# Patient Record
Sex: FEMALE | Race: WHITE
[De-identification: names, ages, dates, MRNs, and addresses within clinical notes are randomized per-mention and may not be internally consistent; named-entity substitution may affect disease eponyms.]

---

## 2018-05-17 ENCOUNTER — HOSPITAL ENCOUNTER (EMERGENCY)
Dept: HOSPITAL 62 - ER | Age: 23
Discharge: HOME | End: 2018-05-17
Payer: OTHER GOVERNMENT

## 2018-05-17 VITALS — DIASTOLIC BLOOD PRESSURE: 71 MMHG | SYSTOLIC BLOOD PRESSURE: 117 MMHG

## 2018-05-17 DIAGNOSIS — O26.891: Primary | ICD-10-CM

## 2018-05-17 DIAGNOSIS — R10.2: ICD-10-CM

## 2018-05-17 DIAGNOSIS — Z3A.00: ICD-10-CM

## 2018-05-17 DIAGNOSIS — O21.9: ICD-10-CM

## 2018-05-17 DIAGNOSIS — Z88.0: ICD-10-CM

## 2018-05-17 DIAGNOSIS — R53.1: ICD-10-CM

## 2018-05-17 LAB
ADD MANUAL DIFF: NO
ALBUMIN SERPL-MCNC: 4.5 G/DL (ref 3.5–5)
ALP SERPL-CCNC: 59 U/L (ref 38–126)
ALT SERPL-CCNC: 39 U/L (ref 9–52)
ANION GAP SERPL CALC-SCNC: 17 MMOL/L (ref 5–19)
APPEARANCE UR: CLEAR
APTT PPP: YELLOW S
AST SERPL-CCNC: 25 U/L (ref 14–36)
BASOPHILS # BLD AUTO: 0.1 10^3/UL (ref 0–0.2)
BASOPHILS NFR BLD AUTO: 0.5 % (ref 0–2)
BILIRUB DIRECT SERPL-MCNC: 0.3 MG/DL (ref 0–0.4)
BILIRUB SERPL-MCNC: 0.5 MG/DL (ref 0.2–1.3)
BILIRUB UR QL STRIP: NEGATIVE
BUN SERPL-MCNC: 7 MG/DL (ref 7–20)
CALCIUM: 10.2 MG/DL (ref 8.4–10.2)
CHLORIDE SERPL-SCNC: 102 MMOL/L (ref 98–107)
CO2 SERPL-SCNC: 22 MMOL/L (ref 22–30)
EOSINOPHIL # BLD AUTO: 0.1 10^3/UL (ref 0–0.6)
EOSINOPHIL NFR BLD AUTO: 0.8 % (ref 0–6)
ERYTHROCYTE [DISTWIDTH] IN BLOOD BY AUTOMATED COUNT: 13.7 % (ref 11.5–14)
GLUCOSE SERPL-MCNC: 78 MG/DL (ref 75–110)
GLUCOSE UR STRIP-MCNC: NEGATIVE MG/DL
HCT VFR BLD CALC: 40.2 % (ref 36–47)
HGB BLD-MCNC: 13.6 G/DL (ref 12–15.5)
KETONES UR STRIP-MCNC: 20 MG/DL
LYMPHOCYTES # BLD AUTO: 2.8 10^3/UL (ref 0.5–4.7)
LYMPHOCYTES NFR BLD AUTO: 18.9 % (ref 13–45)
MCH RBC QN AUTO: 30.6 PG (ref 27–33.4)
MCHC RBC AUTO-ENTMCNC: 33.9 G/DL (ref 32–36)
MCV RBC AUTO: 90 FL (ref 80–97)
MONOCYTES # BLD AUTO: 1 10^3/UL (ref 0.1–1.4)
MONOCYTES NFR BLD AUTO: 6.8 % (ref 3–13)
NEUTROPHILS # BLD AUTO: 10.8 10^3/UL (ref 1.7–8.2)
NEUTS SEG NFR BLD AUTO: 73 % (ref 42–78)
NITRITE UR QL STRIP: NEGATIVE
PH UR STRIP: 6 [PH] (ref 5–9)
PLATELET # BLD: 299 10^3/UL (ref 150–450)
POTASSIUM SERPL-SCNC: 4 MMOL/L (ref 3.6–5)
PROT SERPL-MCNC: 7.6 G/DL (ref 6.3–8.2)
PROT UR STRIP-MCNC: NEGATIVE MG/DL
RBC # BLD AUTO: 4.45 10^6/UL (ref 3.72–5.28)
SODIUM SERPL-SCNC: 141 MMOL/L (ref 137–145)
SP GR UR STRIP: 1
TOTAL CELLS COUNTED % (AUTO): 100 %
UROBILINOGEN UR-MCNC: NEGATIVE MG/DL (ref ?–2)
WBC # BLD AUTO: 14.8 10^3/UL (ref 4–10.5)

## 2018-05-17 PROCEDURE — 84702 CHORIONIC GONADOTROPIN TEST: CPT

## 2018-05-17 PROCEDURE — S0119 ONDANSETRON 4 MG: HCPCS

## 2018-05-17 PROCEDURE — 36415 COLL VENOUS BLD VENIPUNCTURE: CPT

## 2018-05-17 PROCEDURE — 99284 EMERGENCY DEPT VISIT MOD MDM: CPT

## 2018-05-17 PROCEDURE — 80053 COMPREHEN METABOLIC PANEL: CPT

## 2018-05-17 PROCEDURE — 81001 URINALYSIS AUTO W/SCOPE: CPT

## 2018-05-17 PROCEDURE — 85025 COMPLETE CBC W/AUTO DIFF WBC: CPT

## 2018-05-17 NOTE — ER DOCUMENT REPORT
ED General





- General


Chief Complaint: Nausea/Vomiting


Stated Complaint: VOMITING


Time Seen by Provider: 18 19:34


TRAVEL OUTSIDE OF THE [place] IN LAST 30 DAYS: No





- HPI


Notes: 





Patient is a 22-year-old female  who is approximately 8 weeks pregnant who 

presents to the ED complaining of nausea and vomiting intermittently over the 

last couple weeks with an occasional lower pelvic cramping.  Patient states 

that she did have a visit with her provider a week ago and saw her baby on the 

monitor.  Patient states that she is still able to eat and drink, but does have 

a decreased p.o. intake due to the nausea and vomiting.  She has not been 

taking any medicines for her symptoms.  She has not noticed any vaginal 

discharge, odor, or bleeding.  She has not had any other recent illness.  

Patient states that she does feel weak on occasion as well.  Denies any IV drug 

use alcohol involvement.  Denies any headache, fever, neck pain, URI, sore 

throat, chest pain, palpitations, syncope, cough, shortness of breath, wheeze, 

dyspnea, current abdominal pain, diarrhea, urinary retention, dysuria, hematuria

, back pain, loss of control of bowel or bladder, numbness/tingling, saddle 

anesthesia, muscle paralysis/weakness, or rash.





- Related Data


Allergies/Adverse Reactions: 


 





Penicillins Allergy (Verified 18 19:00)


 











Past Medical History





- Social History


Smoking Status: Unknown if Ever Smoked


Family History: Reviewed & Not Pertinent





Review of Systems





- Review of Systems


-: Yes All other systems reviewed and negative





Physical Exam





- Vital signs


Vitals: 


 











Temp Pulse Resp BP Pulse Ox


 


 98.4 F   74   14   117/71   98 


 


 18 19:20  18 19:20  18 19:20  18 19:20  18 19:20














- Notes


Notes: 





PHYSICAL EXAMINATION:





GENERAL: Well-appearing, well-nourished and in no acute distress.





HEAD: Atraumatic, normocephalic.





EYES: Pupils equal round and reactive to light, extraocular movements intact, 

sclera anicteric, conjunctiva are normal.





ENT:  Nares patent and without discharge.  oropharynx clear without exudates.  

No tonsilar hypertrophy or erythema.  Moist mucous membranes. 





NECK: Normal range of motion, supple without lymphadenopathy





LUNGS: Breath sounds clear to auscultation bilaterally and equal.  No wheezes 

rales or rhonchi.





HEART: Regular rate and rhythm without murmurs, rubs, gallops.





ABDOMEN: Soft, nontender, nondistended abdomen.  No guarding, no rebound.  No 

masses appreciated.  Normal bowel sounds present.  No CVA tenderness 

bilaterally.





Musculoskeletal: FROM to passive/active. Strength 5+/5. 





Extremities:  No cyanosis, clubbing, or edema b/l.  Peripheral pulses 2+.  

Capillary refill less than 3 seconds.





NEUROLOGICAL: Normal speech, normal gait.  Normal sensory, motor exams 





PSYCH: Normal mood, normal affect.





SKIN: Warm, Dry, normal turgor, no rashes or lesions noted.





Course





- Re-evaluation


Re-evalutation: 





18 22:33


Patient is an afebrile, well-hydrated, 22-year-old female who presents to the 

ED with nausea, vomiting, intermittent pelvic pain, suspect secondary to her 

pregnancy at this time.  Vitals are acceptable.  PE is otherwise unremarkable.  

CBC, CMP, urinalysis were unremarkable for any acute pathology.  See hCG.  

Fetal heart tones were not able to be heard at this time which is most likely 

due to her being early in her pregnancy.  She is tolerating p.o. without any 

difficulties.  Zofran was given p.o.  No other labs or imaging warranted at 

this time based on H&P.  Patient has no significant tachycardia, tachypnea, or 

hypoxia.  She is nontoxic-appearing.  Patient's abdomen is soft and nontender 

at this time.  Based on H&P today I have low suspicion for acute appendicitis, 

bowel obstruction, acute cholecystitis, acute cholangitis, perforated 

diverticulitis, incarcerated hernia, pancreatitis, perforated ulcer, peritonitis

, sepsis, or other systemic emergent condition at this time.  Patient is aware 

that her condition can change from initial presentation and she needs to 

monitor symptoms closely and seek medical attention if any acute changes.  Rx 

for zofran.  Strict return precautions.  Conservative measures otherwise for 

symptoms.  Recheck with OBGYN in 2-3 days.  Recheck with your PCM in 2-3 days.  

Return to the ED with any worsening/concerning symptoms otherwise as reviewed 

in discharge.  Patient is in agreement.








- Vital Signs


Vital signs: 


 











Temp Pulse Resp BP Pulse Ox


 


 98.4 F   74   14   117/71   98 


 


 18 19:20  18 19:20  18 19:20  18 19:20  18 19:20














- Laboratory


Result Diagrams: 


 18 20:24





 18 20:24


Laboratory results interpreted by me: 


 











  18





  20:24 20:24 20:33


 


WBC  14.8 H  


 


Absolute Neutrophils  10.8 H  


 


Beta HCG, Quant   573317.00 H 


 


Urine Ketones    20 H


 


Ur Leukocyte Esterase    SMALL H














Discharge





- Discharge


Clinical Impression: 


Pelvic pain affecting pregnancy


Qualifiers:


 Trimester: first trimester Qualified Code(s): O26.891 - Other specified 

pregnancy related conditions, first trimester; R10.2 - Pelvic and perineal pain

; R10.2 - Pelvic and perineal pain





Condition: Stable


Disposition: HOME, SELF-CARE


Instructions:  Pelvic Pain in Pregnancy ([facility])


Additional Instructions: 


Maintain fluid intake


Proper hygenic technique


Keep the skin clean


Tylenol as needed


Return immediately if symptoms worsen


F/u with your PCM/OBGYN in 2-3 days for a recheck





Return to the ED with any development of HA/fever, trouble with vision, eye 

redness, worsening pain, urethral discharge, urinary retention, blood in the 

urine, worsening abdominal pain, n/v, Chest Pain, shortness of breath, joint 

pains, trouble breathing, vaginal discharge/bleeding, or any other worsening/

concerning symptoms as needed otherwise.


Prescriptions: 


Ondansetron [Zofran Odt 4 mg Tablet] 1 - 2 tab PO Q4H PRN #15 tab.rapdis


 PRN Reason: For Nausea/Vomiting


Referrals: 


[facility] [Provider Group] - Follow up as needed

## 2018-05-22 ENCOUNTER — HOSPITAL ENCOUNTER (OUTPATIENT)
Dept: HOSPITAL 62 - 2S | Age: 23
Setting detail: OBSERVATION
LOS: 1 days | Discharge: HOME | End: 2018-05-23
Attending: CLINIC/CENTER | Admitting: CLINIC/CENTER
Payer: OTHER GOVERNMENT

## 2018-05-22 DIAGNOSIS — O21.1: Primary | ICD-10-CM

## 2018-05-22 DIAGNOSIS — R63.4: ICD-10-CM

## 2018-05-22 DIAGNOSIS — Z3A.08: ICD-10-CM

## 2018-05-22 LAB
ADD MANUAL DIFF: NO
ALBUMIN SERPL-MCNC: 4.4 G/DL (ref 3.5–5)
ALP SERPL-CCNC: 56 U/L (ref 38–126)
ALT SERPL-CCNC: 45 U/L (ref 9–52)
ANION GAP SERPL CALC-SCNC: 17 MMOL/L (ref 5–19)
APPEARANCE UR: (no result)
APTT PPP: YELLOW S
AST SERPL-CCNC: 40 U/L (ref 14–36)
BASOPHILS # BLD AUTO: 0.1 10^3/UL (ref 0–0.2)
BASOPHILS NFR BLD AUTO: 0.4 % (ref 0–2)
BILIRUB DIRECT SERPL-MCNC: 0.4 MG/DL (ref 0–0.4)
BILIRUB SERPL-MCNC: 0.6 MG/DL (ref 0.2–1.3)
BILIRUB UR QL STRIP: NEGATIVE
BUN SERPL-MCNC: 8 MG/DL (ref 7–20)
CALCIUM: 10.1 MG/DL (ref 8.4–10.2)
CHLORIDE SERPL-SCNC: 102 MMOL/L (ref 98–107)
CO2 SERPL-SCNC: 24 MMOL/L (ref 22–30)
EOSINOPHIL # BLD AUTO: 0.1 10^3/UL (ref 0–0.6)
EOSINOPHIL NFR BLD AUTO: 0.6 % (ref 0–6)
ERYTHROCYTE [DISTWIDTH] IN BLOOD BY AUTOMATED COUNT: 13.9 % (ref 11.5–14)
GLUCOSE SERPL-MCNC: 105 MG/DL (ref 75–110)
GLUCOSE UR STRIP-MCNC: NEGATIVE MG/DL
HCT VFR BLD CALC: 39 % (ref 36–47)
HGB BLD-MCNC: 13.2 G/DL (ref 12–15.5)
KETONES UR STRIP-MCNC: NEGATIVE MG/DL
LYMPHOCYTES # BLD AUTO: 2.3 10^3/UL (ref 0.5–4.7)
LYMPHOCYTES NFR BLD AUTO: 17.4 % (ref 13–45)
MCH RBC QN AUTO: 30.5 PG (ref 27–33.4)
MCHC RBC AUTO-ENTMCNC: 33.9 G/DL (ref 32–36)
MCV RBC AUTO: 90 FL (ref 80–97)
MONOCYTES # BLD AUTO: 1 10^3/UL (ref 0.1–1.4)
MONOCYTES NFR BLD AUTO: 7.4 % (ref 3–13)
NEUTROPHILS # BLD AUTO: 9.7 10^3/UL (ref 1.7–8.2)
NEUTS SEG NFR BLD AUTO: 74.2 % (ref 42–78)
NITRITE UR QL STRIP: NEGATIVE
PH UR STRIP: 5 [PH] (ref 5–9)
PLATELET # BLD: 268 10^3/UL (ref 150–450)
POTASSIUM SERPL-SCNC: 3.8 MMOL/L (ref 3.6–5)
PROT SERPL-MCNC: 7.5 G/DL (ref 6.3–8.2)
PROT UR STRIP-MCNC: NEGATIVE MG/DL
RBC # BLD AUTO: 4.33 10^6/UL (ref 3.72–5.28)
SODIUM SERPL-SCNC: 142.6 MMOL/L (ref 137–145)
SP GR UR STRIP: 1.02
TOTAL CELLS COUNTED % (AUTO): 100 %
UROBILINOGEN UR-MCNC: 4 MG/DL (ref ?–2)
WBC # BLD AUTO: 13.1 10^3/UL (ref 4–10.5)

## 2018-05-22 PROCEDURE — 81001 URINALYSIS AUTO W/SCOPE: CPT

## 2018-05-22 PROCEDURE — 86850 RBC ANTIBODY SCREEN: CPT

## 2018-05-22 PROCEDURE — 86901 BLOOD TYPING SEROLOGIC RH(D): CPT

## 2018-05-22 PROCEDURE — 80053 COMPREHEN METABOLIC PANEL: CPT

## 2018-05-22 PROCEDURE — G0378 HOSPITAL OBSERVATION PER HR: HCPCS

## 2018-05-22 PROCEDURE — 86900 BLOOD TYPING SEROLOGIC ABO: CPT

## 2018-05-22 PROCEDURE — 85025 COMPLETE CBC W/AUTO DIFF WBC: CPT

## 2018-05-22 PROCEDURE — 36415 COLL VENOUS BLD VENIPUNCTURE: CPT

## 2018-05-22 PROCEDURE — G0379 DIRECT REFER HOSPITAL OBSERV: HCPCS

## 2018-05-22 PROCEDURE — S0119 ONDANSETRON 4 MG: HCPCS

## 2018-05-22 PROCEDURE — 86592 SYPHILIS TEST NON-TREP QUAL: CPT

## 2018-05-22 RX ADMIN — ONDANSETRON PRN MG: 4 TABLET, ORALLY DISINTEGRATING ORAL at 22:51

## 2018-05-23 VITALS — SYSTOLIC BLOOD PRESSURE: 116 MMHG | DIASTOLIC BLOOD PRESSURE: 61 MMHG

## 2018-05-23 LAB
ALBUMIN SERPL-MCNC: 3.4 G/DL (ref 3.5–5)
ALP SERPL-CCNC: 49 U/L (ref 38–126)
ALT SERPL-CCNC: 55 U/L (ref 9–52)
ANION GAP SERPL CALC-SCNC: 11 MMOL/L (ref 5–19)
AST SERPL-CCNC: 35 U/L (ref 14–36)
BILIRUB DIRECT SERPL-MCNC: 0.3 MG/DL (ref 0–0.4)
BILIRUB SERPL-MCNC: 0.6 MG/DL (ref 0.2–1.3)
BUN SERPL-MCNC: 3 MG/DL (ref 7–20)
CALCIUM: 9.1 MG/DL (ref 8.4–10.2)
CHLORIDE SERPL-SCNC: 109 MMOL/L (ref 98–107)
CO2 SERPL-SCNC: 20 MMOL/L (ref 22–30)
GLUCOSE SERPL-MCNC: 81 MG/DL (ref 75–110)
POTASSIUM SERPL-SCNC: 3.5 MMOL/L (ref 3.6–5)
PROT SERPL-MCNC: 6 G/DL (ref 6.3–8.2)
SODIUM SERPL-SCNC: 140.4 MMOL/L (ref 137–145)

## 2018-05-23 RX ADMIN — ONDANSETRON PRN MG: 4 TABLET, ORALLY DISINTEGRATING ORAL at 08:20

## 2018-05-25 NOTE — DISCHARGE SUMMARY E
Discharge Summary



NAME: AYAKA CLEMENTS

MRN:  [id number]        : 1995     AGE: 22Y

ADMITTED: 2018                  DISCHARGED: 2018



FINAL DIAGNOSES:

1.  Intrauterine pregnancy 8 weeks with hyperemesis gravidarum.

2.  Dehydration.



HISTORY OF PRESENT ILLNESS:

This is a 22-year-old,  1, para 0, whose last menstrual period was

, known to be 8 weeks pregnant and was having problems with

hyperemesis, dehydration, and weight loss.  She was evaluated in the

office, found to be dehydrated, and it was elected to bring her in the

hospital for further evaluation and treatment.



PAST MEDICAL HISTORY:

Essentially negative.



ALLERGIES:

She is allergic to PENICILLIN.



MEDICATIONS:

She was currently taking Zofran and B6, because of the hyperemesis.



VITAL SIGNS:

At the time of admission her weight was 169 pounds with *------* that is

about a 7 pound weight loss over previous measurement.  Height was 60

inches, blood pressure 102/70, temperature was 98.5, BMI was 33, pulse was

104, pO2 was 96.



REVIEW OF SYSTEMS:

Revealed HEENT to be within normal limits.  Trachea was midline.  Thyroid

was not enlarged.  Lungs were clear.  Heart regular rhythm without

murmurs.  Abdomen is soft, nondistended, nontender with no organomegaly. 

Pelvic examination was consistent with an 8 week intrauterine pregnancy,

positive fetal heart rate, positive fetal movement on Sono.  Extremities

were within normal limits.  Neuro system revealed orientated x3 with

motor, cranial, and sensory nerves being grossly intact.



DIAGNOSTICS:

At the time of admission her white count was 13.1, hemoglobin 13.2,

platelets was 268.  Sodium was 142, potassium 3.8, chloride is 102.  Liver

enzymes revealed AST at 40, ALT at 45.  She had a GFR greater than 60.





HOSPITAL COURSE:

It was our impression to rehydrate her with IVs and treated for the nausea

and vomiting.  She was started on IV rehydration therapy alternating

normal saline with ringers lactated, 5% dextrose.  We came in the next

day.  She had experienced no additional nausea or vomiting.  No vaginal

bleeding at the time.  On , her temperature was 98.5, blood pressure

was 112/62, pulse was 59, respirations were 16.  She was still a little

bit nauseated but as stated no vomiting.  She was alert.  Motor, cranial,

and sensory nerves intact.  Lungs were clear.  Heart regular rhythm

without murmurs, abdomen was soft.  At that time the assessment was that

she had been adequately rehydrated.  She was sent on a BRAT diet.  She was

to follow up with us in the office on 2018.







DICTATING PHYSICIAN:  LUIS SAMS M.D.





[id number]                  DT: 2018    2216

PHY#: [id number]            DD: 2018    1626

ID:   [id number]           JOB#: [id number]       ACCT: [id number]



cc:LUIS SAMS M.D.

>

## 2018-09-07 ENCOUNTER — HOSPITAL ENCOUNTER (OUTPATIENT)
Dept: HOSPITAL 62 - LC | Age: 23
Discharge: HOME | End: 2018-09-07
Attending: OBSTETRICS & GYNECOLOGY
Payer: OTHER GOVERNMENT

## 2018-09-07 DIAGNOSIS — R10.2: ICD-10-CM

## 2018-09-07 DIAGNOSIS — O26.892: Primary | ICD-10-CM

## 2018-09-07 DIAGNOSIS — E86.0: ICD-10-CM

## 2018-09-07 DIAGNOSIS — Z3A.24: ICD-10-CM

## 2018-09-07 DIAGNOSIS — O99.282: ICD-10-CM

## 2018-09-07 LAB
ADD MANUAL DIFF: NO
APPEARANCE UR: (no result)
APTT PPP: YELLOW S
BARBITURATES UR QL SCN: NEGATIVE
BASOPHILS # BLD AUTO: 0 10^3/UL (ref 0–0.2)
BASOPHILS NFR BLD AUTO: 0.4 % (ref 0–2)
BILIRUB UR QL STRIP: NEGATIVE
EOSINOPHIL # BLD AUTO: 0.2 10^3/UL (ref 0–0.6)
EOSINOPHIL NFR BLD AUTO: 2 % (ref 0–6)
ERYTHROCYTE [DISTWIDTH] IN BLOOD BY AUTOMATED COUNT: 13.7 % (ref 11.5–14)
GLUCOSE UR STRIP-MCNC: NEGATIVE MG/DL
HCT VFR BLD CALC: 29.7 % (ref 36–47)
HGB BLD-MCNC: 10.5 G/DL (ref 12–15.5)
KETONES UR STRIP-MCNC: NEGATIVE MG/DL
LYMPHOCYTES # BLD AUTO: 2.4 10^3/UL (ref 0.5–4.7)
LYMPHOCYTES NFR BLD AUTO: 19.6 % (ref 13–45)
MCH RBC QN AUTO: 33.5 PG (ref 27–33.4)
MCHC RBC AUTO-ENTMCNC: 35.2 G/DL (ref 32–36)
MCV RBC AUTO: 95 FL (ref 80–97)
METHADONE UR QL SCN: NEGATIVE
MONOCYTES # BLD AUTO: 1 10^3/UL (ref 0.1–1.4)
MONOCYTES NFR BLD AUTO: 8 % (ref 3–13)
NEUTROPHILS # BLD AUTO: 8.6 10^3/UL (ref 1.7–8.2)
NEUTS SEG NFR BLD AUTO: 70 % (ref 42–78)
NITRITE UR QL STRIP: NEGATIVE
PCP UR QL SCN: NEGATIVE
PH UR STRIP: 6 [PH] (ref 5–9)
PLATELET # BLD: 287 10^3/UL (ref 150–450)
PROT UR STRIP-MCNC: NEGATIVE MG/DL
RBC # BLD AUTO: 3.13 10^6/UL (ref 3.72–5.28)
SP GR UR STRIP: 1.01
TOTAL CELLS COUNTED % (AUTO): 100 %
URINE AMPHETAMINES SCREEN: NEGATIVE
URINE BENZODIAZEPINES SCREEN: NEGATIVE
URINE COCAINE SCREEN: NEGATIVE
URINE MARIJUANA (THC) SCREEN: NEGATIVE
UROBILINOGEN UR-MCNC: NEGATIVE MG/DL (ref ?–2)
WBC # BLD AUTO: 12.3 10^3/UL (ref 4–10.5)

## 2018-09-07 PROCEDURE — 85025 COMPLETE CBC W/AUTO DIFF WBC: CPT

## 2018-09-07 PROCEDURE — 81001 URINALYSIS AUTO W/SCOPE: CPT

## 2018-09-07 PROCEDURE — 4A1HXCZ MONITORING OF PRODUCTS OF CONCEPTION, CARDIAC RATE, EXTERNAL APPROACH: ICD-10-PCS | Performed by: OBSTETRICS & GYNECOLOGY

## 2018-09-07 PROCEDURE — 76815 OB US LIMITED FETUS(S): CPT

## 2018-09-07 PROCEDURE — 94760 N-INVAS EAR/PLS OXIMETRY 1: CPT

## 2018-09-07 PROCEDURE — 80307 DRUG TEST PRSMV CHEM ANLYZR: CPT

## 2018-09-07 PROCEDURE — 36415 COLL VENOUS BLD VENIPUNCTURE: CPT

## 2018-09-07 NOTE — RADIOLOGY REPORT (SQ)
EXAM DESCRIPTION:  U/S OB LIMITED



COMPLETED DATE/TIME:  9/7/2018 9:34 am



REASON FOR STUDY:  cervical length for complaints of pressure



COMPARISON:  None.



TECHNIQUE:  Limited transabdominal grayscale ultrasound for evaluation of specific requested obstetri
daly parameters.



LIMITATIONS:  None.



FINDINGS:  CERVICAL LENGTH: 4.8 cm.   Closed.

PALLAVI: 14.1 cm.

FHR: 147 beats per minute.

PRESENTATION: Cephalic.

PLACENTA:  Posterior.

OTHER: No other significant findings.



IMPRESSION:  LIMITED OBSTETRICAL ULTRASOUND WITH MEASURED PARAMETERS DELINEATED ABOVE.

Trimester of pregnancy: Second trimester - 13 weeks 1 day to 27 weeks 6 days.



TECHNICAL DOCUMENTATION:  JOB ID:  [id number]

 2011 [employer]- All Rights Reserved



Reading location - IP/workstation name: [email or web address]

## 2018-10-01 ENCOUNTER — HOSPITAL ENCOUNTER (OUTPATIENT)
Dept: HOSPITAL 62 - LC | Age: 23
Discharge: HOME | End: 2018-10-01
Attending: OBSTETRICS & GYNECOLOGY
Payer: OTHER GOVERNMENT

## 2018-10-01 DIAGNOSIS — Z3A.27: ICD-10-CM

## 2018-10-01 DIAGNOSIS — O26.892: Primary | ICD-10-CM

## 2018-10-01 DIAGNOSIS — R10.9: ICD-10-CM

## 2018-10-01 LAB
APPEARANCE UR: (no result)
APTT PPP: (no result) S
BARBITURATES UR QL SCN: NEGATIVE
BILIRUB UR QL STRIP: NEGATIVE
GLUCOSE UR STRIP-MCNC: NEGATIVE MG/DL
KETONES UR STRIP-MCNC: 20 MG/DL
METHADONE UR QL SCN: NEGATIVE
NITRITE UR QL STRIP: NEGATIVE
PCP UR QL SCN: NEGATIVE
PH UR STRIP: 5 [PH] (ref 5–9)
PROT UR STRIP-MCNC: 100 MG/DL
SP GR UR STRIP: 1.02
URINE AMPHETAMINES SCREEN: NEGATIVE
URINE BENZODIAZEPINES SCREEN: NEGATIVE
URINE COCAINE SCREEN: NEGATIVE
URINE MARIJUANA (THC) SCREEN: NEGATIVE
UROBILINOGEN UR-MCNC: NEGATIVE MG/DL (ref ?–2)

## 2018-10-01 PROCEDURE — 4A1HXCZ MONITORING OF PRODUCTS OF CONCEPTION, CARDIAC RATE, EXTERNAL APPROACH: ICD-10-PCS | Performed by: OBSTETRICS & GYNECOLOGY

## 2018-10-01 PROCEDURE — 81001 URINALYSIS AUTO W/SCOPE: CPT

## 2018-10-01 PROCEDURE — 80307 DRUG TEST PRSMV CHEM ANLYZR: CPT

## 2018-10-22 ENCOUNTER — HOSPITAL ENCOUNTER (OUTPATIENT)
Dept: HOSPITAL 62 - LC | Age: 23
Discharge: STILL ADMITTED OR ADMINISTRATIVE | End: 2018-10-22
Attending: OBSTETRICS & GYNECOLOGY
Payer: OTHER GOVERNMENT

## 2018-10-22 VITALS — DIASTOLIC BLOOD PRESSURE: 75 MMHG | SYSTOLIC BLOOD PRESSURE: 129 MMHG

## 2018-10-22 DIAGNOSIS — Z3A.30: ICD-10-CM

## 2018-10-22 DIAGNOSIS — R11.2: ICD-10-CM

## 2018-10-22 DIAGNOSIS — O99.283: Primary | ICD-10-CM

## 2018-10-22 DIAGNOSIS — E86.0: ICD-10-CM

## 2018-10-22 LAB
APPEARANCE UR: (no result)
APTT PPP: YELLOW S
BARBITURATES UR QL SCN: NEGATIVE
BILIRUB UR QL STRIP: NEGATIVE
GLUCOSE UR STRIP-MCNC: NEGATIVE MG/DL
KETONES UR STRIP-MCNC: 80 MG/DL
METHADONE UR QL SCN: NEGATIVE
NITRITE UR QL STRIP: NEGATIVE
PCP UR QL SCN: NEGATIVE
PH UR STRIP: 6 [PH] (ref 5–9)
PROT UR STRIP-MCNC: 30 MG/DL
SP GR UR STRIP: 1.02
URINE AMPHETAMINES SCREEN: NEGATIVE
URINE BENZODIAZEPINES SCREEN: NEGATIVE
URINE COCAINE SCREEN: NEGATIVE
URINE MARIJUANA (THC) SCREEN: NEGATIVE
UROBILINOGEN UR-MCNC: NEGATIVE MG/DL (ref ?–2)

## 2018-10-22 PROCEDURE — 80307 DRUG TEST PRSMV CHEM ANLYZR: CPT

## 2018-10-22 PROCEDURE — 4A1HXCZ MONITORING OF PRODUCTS OF CONCEPTION, CARDIAC RATE, EXTERNAL APPROACH: ICD-10-PCS | Performed by: OBSTETRICS & GYNECOLOGY

## 2018-10-22 PROCEDURE — 81001 URINALYSIS AUTO W/SCOPE: CPT

## 2018-10-22 NOTE — ER DOCUMENT REPORT
ED Medical Screen (RME)





- General


Chief Complaint: Nausea/Vomiting


Stated Complaint: VOMITING


Notes: 





Patient is a 23-year-old female that is approximately 30 weeks pregnant, 

complains of nausea and vomiting, seen by triage nursing, and will be 

disposition to L&D, for evaluation.  Vital signs reviewed.  Hemodynamically 

stable, no tachycardia, blood pressure 129/75 I did not personally see or 

evaluate this patient.


TRAVEL OUTSIDE OF THE [place] IN LAST 30 DAYS: No





- Related Data


Allergies/Adverse Reactions: 


 





Penicillins Allergy (Verified 10/01/18 21:33)


 


shellfish derived Allergy (Verified 10/01/18 21:33)


 











Past Medical History


Renal/ Medical History: Denies: Hx Peritoneal Dialysis





Physical Exam





- Vital signs


Vitals: 





 











Temp Pulse Resp BP Pulse Ox


 


 98.6 F   79   16   129/75 H  98 


 


 10/22/18 13:05  10/22/18 13:05  10/22/18 13:05  10/22/18 13:05  10/22/18 13:05














Course





- Vital Signs


Vital signs: 





 











Temp Pulse Resp BP Pulse Ox


 


 98.6 F   79   16   129/75 H  98 


 


 10/22/18 13:05  10/22/18 13:05  10/22/18 13:05  10/22/18 13:05  10/22/18 13:05














Doctor's Discharge





- Discharge


Clinical Impression: 


Nausea & vomiting


Qualifiers:


 Vomiting type: unspecified Vomiting Intractability: unspecified Qualified Code(

s): R11.2 - Nausea with vomiting, unspecified





Condition: Stable


Disposition: LABOR CHECK

## 2018-10-29 ENCOUNTER — HOSPITAL ENCOUNTER (OUTPATIENT)
Dept: HOSPITAL 62 - LC | Age: 23
Discharge: HOME | End: 2018-10-29
Attending: OBSTETRICS & GYNECOLOGY
Payer: OTHER GOVERNMENT

## 2018-10-29 DIAGNOSIS — Z3A.31: ICD-10-CM

## 2018-10-29 DIAGNOSIS — K52.9: ICD-10-CM

## 2018-10-29 DIAGNOSIS — O99.613: Primary | ICD-10-CM

## 2018-10-29 LAB
APPEARANCE UR: (no result)
APTT PPP: (no result) S
BARBITURATES UR QL SCN: NEGATIVE
BILIRUB UR QL STRIP: NEGATIVE
GLUCOSE UR STRIP-MCNC: NEGATIVE MG/DL
KETONES UR STRIP-MCNC: 80 MG/DL
METHADONE UR QL SCN: NEGATIVE
NITRITE UR QL STRIP: NEGATIVE
PCP UR QL SCN: NEGATIVE
PH UR STRIP: 5 [PH] (ref 5–9)
PROT UR STRIP-MCNC: 100 MG/DL
SP GR UR STRIP: 1.02
URINE AMPHETAMINES SCREEN: NEGATIVE
URINE BENZODIAZEPINES SCREEN: NEGATIVE
URINE COCAINE SCREEN: NEGATIVE
URINE MARIJUANA (THC) SCREEN: NEGATIVE
UROBILINOGEN UR-MCNC: NEGATIVE MG/DL (ref ?–2)

## 2018-10-29 PROCEDURE — 81001 URINALYSIS AUTO W/SCOPE: CPT

## 2018-10-29 PROCEDURE — 80307 DRUG TEST PRSMV CHEM ANLYZR: CPT

## 2018-10-29 PROCEDURE — 4A1HXCZ MONITORING OF PRODUCTS OF CONCEPTION, CARDIAC RATE, EXTERNAL APPROACH: ICD-10-PCS | Performed by: OBSTETRICS & GYNECOLOGY

## 2018-11-17 ENCOUNTER — HOSPITAL ENCOUNTER (OUTPATIENT)
Dept: HOSPITAL 62 - LC | Age: 23
Discharge: HOME | End: 2018-11-17
Attending: OBSTETRICS & GYNECOLOGY
Payer: OTHER GOVERNMENT

## 2018-11-17 DIAGNOSIS — Z34.93: Primary | ICD-10-CM

## 2018-11-17 DIAGNOSIS — Z3A.34: ICD-10-CM

## 2018-11-17 LAB
APPEARANCE UR: (no result)
APTT PPP: YELLOW S
BARBITURATES UR QL SCN: NEGATIVE
BILIRUB UR QL STRIP: NEGATIVE
GLUCOSE UR STRIP-MCNC: NEGATIVE MG/DL
KETONES UR STRIP-MCNC: NEGATIVE MG/DL
METHADONE UR QL SCN: NEGATIVE
NITRITE UR QL STRIP: NEGATIVE
PCP UR QL SCN: NEGATIVE
PH UR STRIP: 7 [PH] (ref 5–9)
PROT UR STRIP-MCNC: 30 MG/DL
SP GR UR STRIP: 1.02
URINE AMPHETAMINES SCREEN: NEGATIVE
URINE BENZODIAZEPINES SCREEN: NEGATIVE
URINE COCAINE SCREEN: NEGATIVE
URINE MARIJUANA (THC) SCREEN: NEGATIVE
UROBILINOGEN UR-MCNC: NEGATIVE MG/DL (ref ?–2)

## 2018-11-17 PROCEDURE — 81001 URINALYSIS AUTO W/SCOPE: CPT

## 2018-11-17 PROCEDURE — 80307 DRUG TEST PRSMV CHEM ANLYZR: CPT

## 2018-11-17 PROCEDURE — 84112 EVAL AMNIOTIC FLUID PROTEIN: CPT

## 2018-11-17 PROCEDURE — 4A1HXCZ MONITORING OF PRODUCTS OF CONCEPTION, CARDIAC RATE, EXTERNAL APPROACH: ICD-10-PCS | Performed by: OBSTETRICS & GYNECOLOGY

## 2018-11-17 PROCEDURE — 59025 FETAL NON-STRESS TEST: CPT

## 2018-11-17 NOTE — NON STRESS TEST REPORT
=================================================================

Non Stress Test

=================================================================

Datetime Report Generated by CPN: 11/17/2018 22:30

   

   

=================================================================

DEMOGRAPHIC

=================================================================

   

EGA NST:  34.1

   

=================================================================

INDICATION

=================================================================

   

Indication for Study:  Ordered by Provider; Other

Indication for Study (NST) Other:  Labor check

   

=================================================================

VITAL SIGNS

=================================================================

   

Temperature - NST:  96.8

Pulse - NST:  63

RESP - NST:  16

NBPSYS NST:  115

NBPDIA NST:  72

   

=================================================================

URINE RESULTS

=================================================================

   

Urine Protein, NST:  Positive

Urine Ketones - NST:  Negative

Urine Glucose - NST:  Negative

Urine Blood - NST:  Negative

   

=================================================================

MONITORING

=================================================================

   

Monitor Explained:  Monitor Explained; Test Explained; Patient

   Verbalized Understanding

Time on Monitor:  11/17/2018 20:25

Time off Monitor:  11/17/2018 22:21

NST Duration:  116

   

=================================================================

NST INTERVENTIONS

=================================================================

   

NST Interventions:  PO Hydration; Reposition Patient

Physician Notified NST:  Dr. Alexei

BABY A:  [id number]

   

=================================================================

BABY A

=================================================================

   

Fetal Movement :  Present

Contraction Frequency :  None

FHR Baseline :  125

Accelerations :  15X15

Decelerations :  None

Variability :  Moderate 6-25bpm

NST Review:  Meets Criteria for Reactive NST

NST Review and Verified By :  B.Ring RN

NST Results:  Reactive

   

=================================================================

NST REPORT

=================================================================

   

Report Trigger:  Send Report

## 2019-07-15 ENCOUNTER — HOSPITAL ENCOUNTER (EMERGENCY)
Dept: HOSPITAL 62 - ER | Age: 24
Discharge: OTHER INSTITUTION | End: 2019-07-15
Payer: OTHER GOVERNMENT

## 2019-07-15 VITALS — DIASTOLIC BLOOD PRESSURE: 79 MMHG | SYSTOLIC BLOOD PRESSURE: 128 MMHG

## 2019-07-15 DIAGNOSIS — Z91.013: ICD-10-CM

## 2019-07-15 DIAGNOSIS — Z88.0: ICD-10-CM

## 2019-07-15 DIAGNOSIS — M54.9: ICD-10-CM

## 2019-07-15 DIAGNOSIS — R10.13: Primary | ICD-10-CM

## 2019-07-15 LAB
ALBUMIN SERPL-MCNC: 4.2 G/DL (ref 3.5–5)
ALP SERPL-CCNC: 60 U/L (ref 38–126)
ALT SERPL-CCNC: 16 U/L (ref 9–52)
ANION GAP SERPL CALC-SCNC: 13 MMOL/L (ref 5–19)
APPEARANCE UR: (no result)
APTT PPP: YELLOW S
AST SERPL-CCNC: 18 U/L (ref 14–36)
BILIRUB DIRECT SERPL-MCNC: 0.2 MG/DL (ref 0–0.4)
BILIRUB SERPL-MCNC: 0.3 MG/DL (ref 0.2–1.3)
BILIRUB UR QL STRIP: NEGATIVE
BUN SERPL-MCNC: 9 MG/DL (ref 7–20)
CALCIUM: 9.7 MG/DL (ref 8.4–10.2)
CHLORIDE SERPL-SCNC: 105 MMOL/L (ref 98–107)
CO2 SERPL-SCNC: 22 MMOL/L (ref 22–30)
GLUCOSE SERPL-MCNC: 123 MG/DL (ref 75–110)
GLUCOSE UR STRIP-MCNC: NEGATIVE MG/DL
KETONES UR STRIP-MCNC: NEGATIVE MG/DL
LIPASE SERPL-CCNC: 106.1 U/L (ref 23–300)
NITRITE UR QL STRIP: NEGATIVE
PH UR STRIP: 6 [PH] (ref 5–9)
POTASSIUM SERPL-SCNC: 4.1 MMOL/L (ref 3.6–5)
PROT SERPL-MCNC: 7.3 G/DL (ref 6.3–8.2)
PROT UR STRIP-MCNC: NEGATIVE MG/DL
SODIUM SERPL-SCNC: 140 MMOL/L (ref 137–145)
SP GR UR STRIP: 1.02
UROBILINOGEN UR-MCNC: NEGATIVE MG/DL (ref ?–2)

## 2019-07-15 PROCEDURE — 99283 EMERGENCY DEPT VISIT LOW MDM: CPT

## 2019-07-15 PROCEDURE — 87086 URINE CULTURE/COLONY COUNT: CPT

## 2019-07-15 PROCEDURE — 80053 COMPREHEN METABOLIC PANEL: CPT

## 2019-07-15 PROCEDURE — 83690 ASSAY OF LIPASE: CPT

## 2019-07-15 PROCEDURE — 81001 URINALYSIS AUTO W/SCOPE: CPT

## 2019-07-15 PROCEDURE — 81025 URINE PREGNANCY TEST: CPT

## 2019-07-15 PROCEDURE — 86677 HELICOBACTER PYLORI ANTIBODY: CPT

## 2019-07-15 NOTE — ER DOCUMENT REPORT
ED GI/





- General


Chief Complaint: Epigastric Pain


Stated Complaint: STOMACH AND BACK PAIN


Time Seen by Provider: 07/15/19 03:56


Primary Care Provider: 


LUIS SAMS MD [Primary Care Provider] - Follow up as needed


Notes: 





24-year-old female patient emergency part chief complaint of abdominal pain.  

Patient states that she was seen at another ER 12 hours ago and told she 

probably had an ulcer and was given medicine but states that the medicine is not

working.  Cannot tell me what medicine that is though.  States that she wants 

something for the pain.  States that she received labs and an ultrasound


TRAVEL OUTSIDE OF THE [place] IN LAST 30 DAYS: No





- HPI


Patient complains to provider of: Abdominal pain


Quality of pain: Stabbing


Severity at maximum: Moderate


Severity in ED: Moderate


Pain Level: 5


Location: Epigastric


Vaginal bleeding (Compared to normal period): None





- Related Data


Allergies/Adverse Reactions: 


                                        





Penicillins Allergy (Verified 11/17/18 20:14)


   


shellfish derived Allergy (Verified 11/17/18 20:14)


   











Past Medical History





- General


Information source: Patient





- Social History


Smoking Status: Never Smoker


Chew tobacco use (# tins/day): No


Frequency of alcohol use: None


Drug Abuse: None


Lives with: Family


Family History: Reviewed & Not Pertinent


Patient has suicidal ideation: No


Patient has homicidal ideation: No





- Medical History


Medical History: Negative


Renal/ Medical History: Denies: Hx Peritoneal Dialysis





Review of Systems





- Review of Systems


Notes: 





Constitutional: denies: Chills, Diaphoresis, Fever, Malaise, Weakness





EENT: denies: Eye discharge, Blurred vision, Tearing, Double vision, Nose 

congestion, Nose discharge, Throat swelling, Mouth pain





Cardiovascular: denies: Palpitations, Heart racing, Orthopnea, Dyspnea, Chest 

pain





Respiratory: denies: Cough, Hurts to breathe, Wheezing, Shortness of breath





Gastrointestinal: Positive epigastric abdominal pain, nausea and vomiting, no 

diarrhea.





Genitourinary: denies: Burning, Dysuria, Discharge, Frequency, Flank pain, 

Hematuria





Musculoskeletal:  denies: Joint pain, Joint swelling, Muscle pain, Muscle 

stiffness, back pain





Hematologic/Lymphatic:  denies: Anemia, Easy bleeding, Easy bruising, Blood 

clots





Neurological/Psychological: denies: Confusion, Dementia, Depression, Loss of 

consciousness





Skin: No lesions, no masses, no skin breakdown, no abscesses





Physical Exam





- Vital signs


Vitals: 


                                        











Temp Pulse Resp BP Pulse Ox


 


 98.7 F   93   17   142/90 H  100 


 


 07/15/19 00:43  07/15/19 00:43  07/15/19 00:43  07/15/19 00:43  07/15/19 00:43











Interpretation: Normal





- General


General appearance: Appears well, Alert





- HEENT


Head: Normocephalic, Atraumatic


Eyes: Normal


Pupils: PERRL





- Respiratory


Respiratory status: No respiratory distress


Chest status: Nontender


Breath sounds: Normal


Chest palpation: Normal





- Cardiovascular


Rhythm: Regular


Heart sounds: Normal auscultation


Murmur: No





- Abdominal


Inspection: Normal


Distension: No distension


Bowel sounds: Normal


Tenderness: Tender - Mild tenderness to palpation in the epigastric region.  No 

guarding or rebound.


Organomegaly: No organomegaly





- Back


Back: Normal, Nontender





- Extremities


General upper extremity: Normal inspection, Nontender, Normal color, Normal ROM,

Normal temperature


General lower extremity: Normal inspection, Nontender, Normal color, Normal ROM,

Normal temperature, Normal weight bearing.  No: Delfin's sign





- Neurological


Neuro grossly intact: Yes


Cognition: Normal


Orientation: AAOx4


[place] Coma Scale Eye Opening: Spontaneous


[place] Coma Scale Verbal: Oriented


[place] Coma Scale Motor: Obeys Commands


[place]than Coma Scale Total: 15


Speech: Normal


Motor strength normal: LUE, RUE, LLE, RLE


Sensory: Normal





- Psychological


Associated symptoms: Normal affect, Normal mood





- Skin


Skin Temperature: Warm


Skin Moisture: Dry


Skin Color: Normal





Course





- Re-evaluation


Re-evalutation: 





07/15/19 04:45


A linda conversation was had with patient with regards to her expectations of 

the visit today.  She was seen in another ER 12 hours ago with labs and 

ultrasound and a prescription for something which she does not know what it is 

because she did not bring it with her.  States that she just wants something for

the pain.  I did a bedside ultrasound of the right upper quadrant which did not 

reveal any gallbladder wall thickness, no stones, no pericholecystic fluid and 

no obvious common bile duct dilation.  Do not feel greatly compelled at this 

time to do any further imaging unless her labs come back abnormal.  She has 

normal vital signs.


07/15/19 06:58





Laboratory











  07/15/19 07/15/19





  05:13 05:13


 


Sodium   140.0


 


Potassium   4.1


 


Chloride   105


 


Carbon Dioxide   22


 


Anion Gap   13


 


BUN   9


 


Creatinine   0.58


 


Est GFR ( Amer)   > 60


 


Est GFR (Non-Af Amer)   > 60


 


Glucose   123 H


 


Calcium   9.7


 


Total Bilirubin   0.3


 


Direct Bilirubin   0.2


 


Neonat Total Bilirubin   Not Reportable


 


Neonat Direct Bilirubin   Not Reportable


 


Neonat Indirect Bili   Not Reportable


 


AST   18


 


ALT   16


 


Alkaline Phosphatase   60


 


Total Protein   7.3


 


Albumin   4.2


 


Lipase   106.1


 


Urine Color  YELLOW 


 


Urine Appearance  SLIGHTLY-CLOUDY 


 


Urine pH  6.0 


 


Ur Specific Gravity  1.020 


 


Urine Protein  NEGATIVE 


 


Urine Glucose (UA)  NEGATIVE 


 


Urine Ketones  NEGATIVE 


 


Urine Blood  NEGATIVE 


 


Urine Nitrite  NEGATIVE 


 


Urine Bilirubin  NEGATIVE 


 


Urine Urobilinogen  NEGATIVE 


 


Ur Leukocyte Esterase  SMALL H 


 


Urine WBC (Auto)  29 


 


Urine RBC (Auto)  2 


 


Urine Bacteria (Auto)  TRACE 


 


Squamous Epi Cells Auto  7 


 


Urine Mucus (Auto)  RARE 


 


Urine Yeast (Budding)  PRESENT 


 


Urine Ascorbic Acid  NEGATIVE 


 


Urine HCG, Qual  NEGATIVE 








Patient symptoms were resolved after GI cocktail.  She already had a CBC so that

was not repeated here.  Her LFTs were normal and ultrasound at the bedside was 

unremarkable.  We will give her follow-up information for GI, primary care and 

general surgery





- Vital Signs


Vital signs: 


                                        











Temp Pulse Resp BP Pulse Ox


 


 98.7 F   93   17   142/90 H  100 


 


 07/15/19 00:43  07/15/19 00:43  07/15/19 00:43  07/15/19 00:43  07/15/19 00:43














- Laboratory


Result Diagrams: 


                                 07/15/19 05:13


Laboratory results interpreted by me: 


                                        











  07/15/19 07/15/19





  05:13 05:13


 


Glucose   123 H


 


Ur Leukocyte Esterase  SMALL H 














Discharge





- Discharge


Clinical Impression: 


 Epigastric abdominal pain





Condition: Good


Disposition: ADMITTED AS INPATIENT


Instructions:  Abdominal Pain ([facility])


Additional Instructions: 


It is very important that you follow-up with your primary care doctor as you 

will more likely need to have an endoscopy if the symptoms continue.  In the 

event you notice any black tarry stool, bright red blood in your stool, vomiting

blood or for any other concerns she should return.  We did a helical back to 

pylori test on you but the results will likely take 1 or 2 weeks to come back.  

Please have your primary care doctor get records.


Prescriptions: 


Dicyclomine HCl [Bentyl 10 mg Capsule] 1 cap PO TID PRN #30 cap


 PRN Reason: For Breakthrough Pain


Esomeprazole Magnesium [Nexium 24Hr] 20 mg PO QAM 30 Days #30 capsule.


Sucralfate [Carafate 1 gm Tablet] 1 gm PO ACHS #120 tablet


Sucralfate [Carafate] 1 gm PO ACHS 10 Days #400 oral.susp


Referrals: 


KEVIN MICHAEL MD [ACTIVE STAFF] - Follow up as needed


MICHELLE QUILES MD [ACTIVE STAFF] - Follow up as needed


LUIS SAMS MD [Primary Care Provider] - Follow up in 3-5 days

## 2020-05-07 ENCOUNTER — HOSPITAL ENCOUNTER (EMERGENCY)
Dept: HOSPITAL 62 - ER | Age: 25
Discharge: HOME | End: 2020-05-07
Payer: OTHER GOVERNMENT

## 2020-05-07 VITALS — DIASTOLIC BLOOD PRESSURE: 52 MMHG | SYSTOLIC BLOOD PRESSURE: 101 MMHG

## 2020-05-07 DIAGNOSIS — Z91.013: ICD-10-CM

## 2020-05-07 DIAGNOSIS — K21.9: ICD-10-CM

## 2020-05-07 DIAGNOSIS — Z79.899: ICD-10-CM

## 2020-05-07 DIAGNOSIS — Z88.0: ICD-10-CM

## 2020-05-07 DIAGNOSIS — D72.828: ICD-10-CM

## 2020-05-07 DIAGNOSIS — R10.811: ICD-10-CM

## 2020-05-07 DIAGNOSIS — R10.816: ICD-10-CM

## 2020-05-07 DIAGNOSIS — R10.11: Primary | ICD-10-CM

## 2020-05-07 LAB
ADD MANUAL DIFF: NO
ALBUMIN SERPL-MCNC: 4.5 G/DL (ref 3.5–5)
ALP SERPL-CCNC: 66 U/L (ref 38–126)
ANION GAP SERPL CALC-SCNC: 10 MMOL/L (ref 5–19)
APPEARANCE UR: (no result)
APTT PPP: YELLOW S
AST SERPL-CCNC: 52 U/L (ref 14–36)
BASOPHILS # BLD AUTO: 0.1 10^3/UL (ref 0–0.2)
BASOPHILS NFR BLD AUTO: 0.5 % (ref 0–2)
BILIRUB DIRECT SERPL-MCNC: 0.1 MG/DL (ref 0–0.4)
BILIRUB SERPL-MCNC: 0.5 MG/DL (ref 0.2–1.3)
BILIRUB UR QL STRIP: NEGATIVE
BUN SERPL-MCNC: 12 MG/DL (ref 7–20)
CALCIUM: 9.2 MG/DL (ref 8.4–10.2)
CHLORIDE SERPL-SCNC: 105 MMOL/L (ref 98–107)
CO2 SERPL-SCNC: 23 MMOL/L (ref 22–30)
EOSINOPHIL # BLD AUTO: 0.2 10^3/UL (ref 0–0.6)
EOSINOPHIL NFR BLD AUTO: 1.6 % (ref 0–6)
ERYTHROCYTE [DISTWIDTH] IN BLOOD BY AUTOMATED COUNT: 16.1 % (ref 11.5–14)
GLUCOSE SERPL-MCNC: 89 MG/DL (ref 75–110)
GLUCOSE UR STRIP-MCNC: NEGATIVE MG/DL
HCT VFR BLD CALC: 33.6 % (ref 36–47)
HGB BLD-MCNC: 11.2 G/DL (ref 12–15.5)
KETONES UR STRIP-MCNC: NEGATIVE MG/DL
LYMPHOCYTES # BLD AUTO: 3.3 10^3/UL (ref 0.5–4.7)
LYMPHOCYTES NFR BLD AUTO: 23.8 % (ref 13–45)
MCH RBC QN AUTO: 28.4 PG (ref 27–33.4)
MCHC RBC AUTO-ENTMCNC: 33.3 G/DL (ref 32–36)
MCV RBC AUTO: 85 FL (ref 80–97)
MONOCYTES # BLD AUTO: 1 10^3/UL (ref 0.1–1.4)
MONOCYTES NFR BLD AUTO: 7.5 % (ref 3–13)
NEUTROPHILS # BLD AUTO: 9.3 10^3/UL (ref 1.7–8.2)
NEUTS SEG NFR BLD AUTO: 66.6 % (ref 42–78)
NITRITE UR QL STRIP: NEGATIVE
PH UR STRIP: 5 [PH] (ref 5–9)
PLATELET # BLD: 351 10^3/UL (ref 150–450)
POTASSIUM SERPL-SCNC: 3.8 MMOL/L (ref 3.6–5)
PROT SERPL-MCNC: 7.7 G/DL (ref 6.3–8.2)
PROT UR STRIP-MCNC: NEGATIVE MG/DL
RBC # BLD AUTO: 3.95 10^6/UL (ref 3.72–5.28)
SP GR UR STRIP: 1.03
TOTAL CELLS COUNTED % (AUTO): 100 %
UROBILINOGEN UR-MCNC: NEGATIVE MG/DL (ref ?–2)
WBC # BLD AUTO: 13.9 10^3/UL (ref 4–10.5)

## 2020-05-07 PROCEDURE — 81025 URINE PREGNANCY TEST: CPT

## 2020-05-07 PROCEDURE — 85025 COMPLETE CBC W/AUTO DIFF WBC: CPT

## 2020-05-07 PROCEDURE — 36415 COLL VENOUS BLD VENIPUNCTURE: CPT

## 2020-05-07 PROCEDURE — 99284 EMERGENCY DEPT VISIT MOD MDM: CPT

## 2020-05-07 PROCEDURE — 81001 URINALYSIS AUTO W/SCOPE: CPT

## 2020-05-07 PROCEDURE — 96360 HYDRATION IV INFUSION INIT: CPT

## 2020-05-07 PROCEDURE — 83690 ASSAY OF LIPASE: CPT

## 2020-05-07 PROCEDURE — 76705 ECHO EXAM OF ABDOMEN: CPT

## 2020-05-07 PROCEDURE — 80053 COMPREHEN METABOLIC PANEL: CPT

## 2020-05-07 NOTE — ER DOCUMENT REPORT
ED GI/





- General


Chief Complaint: Abdominal Pain


Stated Complaint: UPPER RIGHT ABDOMINAL PAIN


Time Seen by Provider: 05/07/20 05:20


Primary Care Provider: 


NO RODRIGUEZ MD [Primary Care Provider] - Follow up tomorrow


Notes: 


Patient is a 24-year-old female that comes emergency department for chief 

complaint of sharp pain in her right upper abdomen that radiates around to her 

right mid back.  Pain is intermittent since 4 AM.  She reports nausea but denies

vomiting.  She had a normal bowel movement 2 hours ago.  She missed dinner.  She

denies fever/chills, vaginal bleeding or discharge, pregnancy.  She denies any 

surgeries or daily medications except Nexium.  She denies ever having pain like 

this in the past.





TRAVEL OUTSIDE OF THE [place] IN LAST 30 DAYS: No





- Related Data


Allergies/Adverse Reactions: 


                                        





Penicillins Allergy (Verified 11/17/18 20:14)


   


shellfish derived Allergy (Verified 11/17/18 20:14)


   











Past Medical History





- General


Information source: Patient





- Social History


Smoking Status: Never Smoker


Frequency of alcohol use: Occasional


Drug Abuse: None


Lives with: Family


Family History: Reviewed & Not Pertinent


Renal/ Medical History: Denies: Hx Peritoneal Dialysis


GI Medical History: Reports: Hx Gastroesophageal Reflux Disease


Surgical Hx: Negative





- Immunizations


Immunizations up to date: Yes


Hx Diphtheria, Pertussis, Tetanus Vaccination: Yes





Review of Systems





- Review of Systems


Constitutional: No symptoms reported


EENT: No symptoms reported


Cardiovascular: No symptoms reported


Respiratory: No symptoms reported


Gastrointestinal: See HPI


Genitourinary: See HPI


Female Genitourinary: No symptoms reported


Musculoskeletal: No symptoms reported


Skin: No symptoms reported


Hematologic/Lymphatic: No symptoms reported


Neurological/Psychological: No symptoms reported





Physical Exam





- Vital signs


Vitals: 


                                        











Temp Pulse Resp BP Pulse Ox


 


 98.1 F   86   20   131/70 H  100 


 


 05/07/20 05:19  05/07/20 05:19  05/07/20 05:19  05/07/20 05:19  05/07/20 05:19














- Notes


Notes: 


GENERAL: Alert, interacts well. No acute distress.


HEAD: Normocephalic, atraumatic.


EYES: Pupils equal, round, and reactive to light. Extraocular movements intact.


ENT: Oral mucosa moist, tongue midline. Oropharynx unremarkable. Airway patent. 


NECK: Full range of motion. Supple. Trachea midline. No lymphadenopathy.


LUNGS: Clear to auscultation bilaterally, no wheezes, rales, or rhonchi. No 

respiratory distress. Non-tender chest wall. 


HEART: Regular rate and rhythm. No murmur


ABDOMEN: Patient with epigastric and right upper quadrant tenderness with 

wincing. Remaining abdomen is soft and benign.  Bowel sounds present, abdomen is

not rigid or distended.


GENITOURINARY: Deferred


EXTREMITIES: Moves all 4 extremities spontaneously. No edema, normal radial and 

dorsalis pedis pulses bilaterally. No cyanosis.


BACK: no cervical, thoracic, lumbar midline tenderness. No saddle anesthesia, 

normal distal neurovascular exam. Moves all extremities in full range of motion.


NEUROLOGICAL: Alert and oriented x3. Normal speech. Cranial nerves II through 

XII grossly intact. Strength 5/5 in all extremities. 


PSYCH: Normal affect, normal mood.


SKIN: Warm, dry, normal turgor. No rashes or lesions noted.








Course





- Re-evaluation


Re-evalutation: 


CBC shows mild leukocytosis at 13,000, elevation of neutrophils, no bandemia.  

Chemistry unremarkable except for borderline LFTs, lipase unremarkable, alk phos

unremarkable, bilirubin unremarkable.  Urinalysis shows mildly elevated specific

gravity, otherwise unremarkable.  Patient has been given IV fluids.





On reevaluation patient appears comfortable.





Ultrasound shows questionable mild fatty infiltration of the liver, unremarkable

gallbladder without pericholecystic fluid, thickened wall, stones, or common 

bile duct dilatation.  Right kidney is unremarkable.  Patient patient's location

of pain which is very specific along with intermittent sharp pain I suspect most

strongly gallbladder dyskinesis.  I discussed patient's ultrasound with her, 

discussed her work-up, discussed possibilities, treatment, follow-up, and return

precautions in detail.  Patient states appreciation and agreement.  Stable and 

well-appearing at time of discharge.





- Vital Signs


Vital signs: 


                                        











Temp Pulse Resp BP Pulse Ox


 


 98.4 F   86   20   131/70 H  100 


 


 05/07/20 06:10  05/07/20 05:19  05/07/20 05:19  05/07/20 05:19  05/07/20 05:19














- Laboratory


Result Diagrams: 


                                 05/07/20 06:10





                                 05/07/20 06:10


Laboratory results interpreted by me: 


                                        











  05/07/20 05/07/20





  06:10 06:10


 


WBC  13.9 H 


 


Hgb  11.2 L 


 


Hct  33.6 L 


 


RDW  16.1 H 


 


Absolute Neuts (auto)  9.3 H 


 


AST   52 H














Discharge





- Discharge


Clinical Impression: 


 RUQ pain





Condition: Stable


Disposition: HOME, SELF-CARE


Additional Instructions: 


Your work-up does not show any concerning findings at this time.  You have mild 

fatty infiltration of the liver as discussed.


Based on your symptoms, evaluation, work-up I most strongly suspect gallbladder 

dyskinesis is causing your pain.  The follow-up test to evaluate for this is a 

HIDA scan.  Call your primary care for close follow-up and testing.  In the 

meantime I strongly recommend that you avoid any fatty, fried, or greasy foods 

as this will likely severely increase your symptoms.





You have been provided with pain and nausea medication to take if needed.  

However if pain becomes severe, sharp vomiting, you develop a fever of 100.4 

greater, or any other concerning symptoms develop return to the emergency 

department.


Prescriptions: 


Hydrocodone/Acetaminophen [Norco 5-325 mg Tablet] 1 - 2 tab PO ASDIR PRN #12 

tablet


 PRN Reason: 


Ondansetron [Zofran Odt 4 mg Tablet] 1 - 2 tab PO Q4H PRN #15 tab.rapdis


 PRN Reason: For Nausea/Vomiting


Forms:  Return to Work


Referrals: 


NO RODRIGUEZ MD [Primary Care Provider] - Follow up tomorrow

## 2020-05-09 ENCOUNTER — HOSPITAL ENCOUNTER (INPATIENT)
Dept: HOSPITAL 62 - ER | Age: 25
LOS: 2 days | Discharge: HOME | DRG: 419 | End: 2020-05-11
Attending: SURGERY | Admitting: SURGERY
Payer: MEDICAID

## 2020-05-09 DIAGNOSIS — K76.0: ICD-10-CM

## 2020-05-09 DIAGNOSIS — K21.9: ICD-10-CM

## 2020-05-09 DIAGNOSIS — K80.43: Primary | ICD-10-CM

## 2020-05-09 DIAGNOSIS — Z03.818: ICD-10-CM

## 2020-05-09 LAB
ADD MANUAL DIFF: NO
ALBUMIN SERPL-MCNC: 4.4 G/DL (ref 3.5–5)
ALP SERPL-CCNC: 146 U/L (ref 38–126)
ANION GAP SERPL CALC-SCNC: 10 MMOL/L (ref 5–19)
APPEARANCE UR: CLEAR
APTT PPP: (no result) S
AST SERPL-CCNC: 88 U/L (ref 14–36)
BASOPHILS # BLD AUTO: 0 10^3/UL (ref 0–0.2)
BASOPHILS NFR BLD AUTO: 0.4 % (ref 0–2)
BILIRUB DIRECT SERPL-MCNC: 1.7 MG/DL (ref 0–0.4)
BILIRUB SERPL-MCNC: 2.4 MG/DL (ref 0.2–1.3)
BILIRUB UR QL STRIP: NEGATIVE
BUN SERPL-MCNC: 7 MG/DL (ref 7–20)
CALCIUM: 9.3 MG/DL (ref 8.4–10.2)
CHLORIDE SERPL-SCNC: 103 MMOL/L (ref 98–107)
CO2 SERPL-SCNC: 24 MMOL/L (ref 22–30)
EOSINOPHIL # BLD AUTO: 0.2 10^3/UL (ref 0–0.6)
EOSINOPHIL NFR BLD AUTO: 2.1 % (ref 0–6)
ERYTHROCYTE [DISTWIDTH] IN BLOOD BY AUTOMATED COUNT: 16.2 % (ref 11.5–14)
GLUCOSE SERPL-MCNC: 94 MG/DL (ref 75–110)
GLUCOSE UR STRIP-MCNC: NEGATIVE MG/DL
HCT VFR BLD CALC: 35.6 % (ref 36–47)
HGB BLD-MCNC: 12.3 G/DL (ref 12–15.5)
KETONES UR STRIP-MCNC: NEGATIVE MG/DL
LYMPHOCYTES # BLD AUTO: 1.7 10^3/UL (ref 0.5–4.7)
LYMPHOCYTES NFR BLD AUTO: 17.4 % (ref 13–45)
MCH RBC QN AUTO: 28.9 PG (ref 27–33.4)
MCHC RBC AUTO-ENTMCNC: 34.5 G/DL (ref 32–36)
MCV RBC AUTO: 84 FL (ref 80–97)
MONOCYTES # BLD AUTO: 0.9 10^3/UL (ref 0.1–1.4)
MONOCYTES NFR BLD AUTO: 9.1 % (ref 3–13)
NEUTROPHILS # BLD AUTO: 6.8 10^3/UL (ref 1.7–8.2)
NEUTS SEG NFR BLD AUTO: 71 % (ref 42–78)
NITRITE UR QL STRIP: NEGATIVE
PH UR STRIP: 6 [PH] (ref 5–9)
PLATELET # BLD: 359 10^3/UL (ref 150–450)
POTASSIUM SERPL-SCNC: 4 MMOL/L (ref 3.6–5)
PROT SERPL-MCNC: 7.7 G/DL (ref 6.3–8.2)
PROT UR STRIP-MCNC: NEGATIVE MG/DL
RBC # BLD AUTO: 4.26 10^6/UL (ref 3.72–5.28)
SP GR UR STRIP: 1.01
TOTAL CELLS COUNTED % (AUTO): 100 %
UROBILINOGEN UR-MCNC: NEGATIVE MG/DL (ref ?–2)
WBC # BLD AUTO: 9.6 10^3/UL (ref 4–10.5)

## 2020-05-09 PROCEDURE — C1757 CATH, THROMBECTOMY/EMBOLECT: HCPCS

## 2020-05-09 PROCEDURE — 74330 X-RAY BILE/PANC ENDOSCOPY: CPT

## 2020-05-09 PROCEDURE — 0FT44ZZ RESECTION OF GALLBLADDER, PERCUTANEOUS ENDOSCOPIC APPROACH: ICD-10-PCS | Performed by: SURGERY

## 2020-05-09 PROCEDURE — 96361 HYDRATE IV INFUSION ADD-ON: CPT

## 2020-05-09 PROCEDURE — 36415 COLL VENOUS BLD VENIPUNCTURE: CPT

## 2020-05-09 PROCEDURE — 80076 HEPATIC FUNCTION PANEL: CPT

## 2020-05-09 PROCEDURE — 83690 ASSAY OF LIPASE: CPT

## 2020-05-09 PROCEDURE — 94799 UNLISTED PULMONARY SVC/PX: CPT

## 2020-05-09 PROCEDURE — 81001 URINALYSIS AUTO W/SCOPE: CPT

## 2020-05-09 PROCEDURE — 80048 BASIC METABOLIC PNL TOTAL CA: CPT

## 2020-05-09 PROCEDURE — 87635 SARS-COV-2 COVID-19 AMP PRB: CPT

## 2020-05-09 PROCEDURE — 85025 COMPLETE CBC W/AUTO DIFF WBC: CPT

## 2020-05-09 PROCEDURE — C1769 GUIDE WIRE: HCPCS

## 2020-05-09 PROCEDURE — 43264 ERCP REMOVE DUCT CALCULI: CPT

## 2020-05-09 PROCEDURE — 76705 ECHO EXAM OF ABDOMEN: CPT

## 2020-05-09 PROCEDURE — 99140 ANES COMP EMERGENCY COND: CPT

## 2020-05-09 PROCEDURE — 96375 TX/PRO/DX INJ NEW DRUG ADDON: CPT

## 2020-05-09 PROCEDURE — 80053 COMPREHEN METABOLIC PANEL: CPT

## 2020-05-09 PROCEDURE — 74300 X-RAY BILE DUCTS/PANCREAS: CPT

## 2020-05-09 PROCEDURE — 87040 BLOOD CULTURE FOR BACTERIA: CPT

## 2020-05-09 PROCEDURE — 96374 THER/PROPH/DIAG INJ IV PUSH: CPT

## 2020-05-09 PROCEDURE — 84703 CHORIONIC GONADOTROPIN ASSAY: CPT

## 2020-05-09 PROCEDURE — 99285 EMERGENCY DEPT VISIT HI MDM: CPT

## 2020-05-09 PROCEDURE — 88304 TISSUE EXAM BY PATHOLOGIST: CPT

## 2020-05-09 PROCEDURE — BF13YZZ FLUOROSCOPY OF GALLBLADDER AND BILE DUCTS USING OTHER CONTRAST: ICD-10-PCS | Performed by: SURGERY

## 2020-05-09 RX ADMIN — SODIUM CHLORIDE PRN MLS/HR: 9 INJECTION, SOLUTION INTRAVENOUS at 14:47

## 2020-05-09 RX ADMIN — Medication SCH ML: at 06:13

## 2020-05-09 RX ADMIN — Medication SCH ML: at 14:44

## 2020-05-09 RX ADMIN — BUPIVACAINE HYDROCHLORIDE ONE ML: 2.5 INJECTION, SOLUTION EPIDURAL; INFILTRATION; INTRACAUDAL; PERINEURAL at 10:50

## 2020-05-09 RX ADMIN — Medication SCH: at 21:02

## 2020-05-09 RX ADMIN — SODIUM CHLORIDE PRN MLS/HR: 9 INJECTION, SOLUTION INTRAVENOUS at 23:40

## 2020-05-09 RX ADMIN — SODIUM CHLORIDE PRN MLS/HR: 9 INJECTION, SOLUTION INTRAVENOUS at 08:34

## 2020-05-09 RX ADMIN — BUPIVACAINE HYDROCHLORIDE ONE ML: 2.5 INJECTION, SOLUTION EPIDURAL; INFILTRATION; INTRACAUDAL; PERINEURAL at 11:58

## 2020-05-09 RX ADMIN — MORPHINE SULFATE PRN MG: 10 INJECTION INTRAMUSCULAR; INTRAVENOUS; SUBCUTANEOUS at 23:40

## 2020-05-09 NOTE — OPERATIVE REPORT
Nonrecallable Operative Report


DATE OF SURGERY: 05/09/20


PREOPERATIVE DIAGNOSIS: acute cholecystitsi possible choledocholithiasis


POSTOPERATIVE DIAGNOSIS: acute cholecystitis and choledocholithiasis


OPERATION: laparoscopic cholecystectomy with introperative cholangiogram


SURGEON: CHRISTIAN LOCKE


ANESTHESIA: GA


TISSUE REMOVED OR ALTERED: gallbladder


COMPLICATIONS: 





none


ESTIMATED BLOOD LOSS: 25cc


INTRAOPERATIVE FINDINGS: common duct stone


PROCEDURE: 





After obtaining informed consent, the patient was taken to the operating room.  

General  Anesthesia was induced; the arms were extended, and the abdomen was 

exposed, and prepped and draped in a sterile fashion.  Instrumentation was set 

up for laparoscopic cholecystectomy.


 


Surgical plan and surgical timeout were conducted.


 


A vertical incision was made above the umbilicus, and a verres needle was 

inserted uneventfully into the peritoneal cavity.  Pneumoperitoneum was 

established.


 


The verres needle was removed and a 10 trocar was inserted and a 10 laparoscope 

was inserted.  Visualization of the peritoneal cavity confirmed safe uneventful 

entry.  Under direct visualization 3 additional 5 mm ports were established, one

in the subxiphoid position and second in the subcostal position.  Visualization 

of the hepatobiliary anatomy revealed no anatomic variations.  A grasper was 

placed on the fundus of the gallbladder and the gallbladder is elevated over the

right surface of the liver; a second grasper was used to grasp the infundibulum 

of the gallbladder.  The neck of the gallbladder and junction with the cystic 

duct was dissected out.  The Cystic artery was in its usual location medial and 

cephalad to the cystic duct.  The cystic artery was surrounded with a right 

angle clamp, clipped twice proximally and divided with laparoscopic scissors.


 


We now opened the triangle of Calot by dividing the peritoneal reflection on 

both the medial and lateral sides of the cystic duct infundibular junction.  The

critical view was obtained.  We now milked the cystic duct of any possible 

stones, proximal cystic duct clip was placed.  And a cystic ductotomy was 

performed.  Cholangiogram catheter was then passed into the cystic duct the 

balloon was inflated and intraoperative cholangiogram revealed obstruction of 

the distal common bile duct with good flow into both the right and left hepatic 

ducts.  The balloon on the cholangiogram catheter was taken down and and 

multiple attempts were performed to try to pass the cholangiogram catheter 

passed the stone and milked back however we were able to do that.





We then therefore remove the cholangiogram catheter divided the cystic duct just

below the proximal clip and placed an Endoloop on the stump. 


 


The gallbladder was now removed from the undersurface of the liver using hook 

cautery dissection.  Graspers were repositioned and the gallbladder was removed 

uneventfully from the abdominal cavity through the super umbilical port site 

incision.  The specimen was examined, then passed off to pathology for permanent

analysis.


 


We returned to the peritoneal cavity check for bleeding, and evidence of bile 

leak, and there was none.  We  Confirmed satisfactory placement of clips on 

cystic duct and cystic artery were secured .  At this point we felt  the 

operation was complete.  The subcutaneous tissue was then anesthetized  with 

quarter percent Marcaine Sponge and needle counts are correct.  All ports 

removed under direct visualization pneumoperitoneum evacuated, and 5 mm port 

wounds closed with 3-0 Vicryl suture, benzoin and Steri-Strips.


 


The patient was extubated, and taken to the recovery room in stable condition.





The case was then discussed with Dr. Alejandre from GI who will see the patient for 

possible ERCP.





Sponge needle counts correct x2 estimated blood loss 25 cc.

## 2020-05-09 NOTE — RADIOLOGY REPORT (SQ)
EXAM DESCRIPTION:

US ABDOMEN LIMITED



COMPLETED DATE/TME:  05/09/2020 02:49



CLINICAL HISTORY:

24 years Female, RUQ pain



Comparison: None.



LIMITATIONS: Bowel gas artifact.



FINDINGS:



Gallbladder sludge.



Poorly defined sludge or echogenic foci measuring up to 1.1 cm

may indicate gallbladder sludge, adherent stones, and/or polyp,

0.3 cm gallbladder wall thickness, negative sonographic Hamilton's

test,   moderate hepatic steatosis, a 0.4-cm diameter common bile

duct, no intrahepatic ductal dilation, hepatopetal patent flow of

the portal vein,  10.6-cm right kidney, obscured pancreas,

visualized vasculature/abdominal aorta, and no significant

ascites appear otherwise unremarkable.



IMPRESSION:

1.  No acute findings.

2.  Poorly defined sludge or echogenic foci measuring up to 1.1

cm may indicate gallbladder sludge, adherent stones, and/or

polyp. Negative sonographic Hamilton's test. Recommend three month

ultrasound surveillance.

3.  Moderate hepatic steatosis.

4.  Limitation.

## 2020-05-09 NOTE — PDOC H&P
History of Present Illness


Admission Date/PCP: 


  05/09/20 05:26





  NO RODRIGUEZ MD





History of Present Illness: 


AYAKA CLEMENTS is a 24 year old femalePatient presents with a 10-day history of 

right upper quadrant pain that radiates to her shoulders.  Patient states she 

has had nausea vomiting as well.  Patient reports vomiting x3 episodes.  Patient

denies any cough or congestion.  Patient denies any urinary symptoms.  Patient 

states that she was seen here 2 days ago and is awaiting to follow-up with her 

primary doctor to see about having an outpatient HIDA scan performed.  Patient 

denies any fever.  Patient states she has not been able to tolerate any oral 

food or fluids due to the vomiting and pain symptoms.  Patient has nausea 

medicine and pain medication at home that have not been helping








Past Medical History


GI Medical History: Reports: Gastroesophageal Reflux Disease





Past Surgical History


Past Surgical History: Reports: None





Social History


Lives with: Family


Smoking Status: Never Smoker


Frequency of Alcohol Use: None


Hx Recreational Drug Use: No


Drugs: None


Hx Prescription Drug Abuse: No





- Advance Directive


Resuscitation Status: Full Code





Family History


Family History: Reviewed & Not Pertinent


Parental Family History Reviewed: No


Children Family History Reviewed: NA


Sibling(s) Family History Reviewed.: NA





Medication/Allergy


Home Medications: 








No Home Medications  05/09/20 








Allergies/Adverse Reactions: 


                                        





Penicillins Allergy (Verified 11/17/18 20:14)


   


shellfish derived Allergy (Verified 11/17/18 20:14)


   











Review of Systems


Constitutional: PRESENT: fatigue


Eyes: ABSENT: as per HPI, visual disturbances, other


Ears: ABSENT: as per HPI, hearing changes, other


Breasts: ABSENT: as per HPI, other


Cardiovascular: ABSENT: as per HPI, chest pain, dyspnea on exertion, edema, 

orthropnea, palpitations, other


Respiratory: ABSENT: as per HPI, cough, dyspnea, hemoptysis, sputum, other


Gastrointestinal: PRESENT: abdominal pain


Musculoskeletal: ABSENT: as per HPI, back pain, deformity, joint swelling, 

muscle weakness, other


Integumentary: ABSENT: as per HPI, diaphoresis, erythema, lesions, pruritus, 

rash, wounds, other


Neurological: ABSENT: as per HPI, abnormal gait, abnormal movements, abnormal 

speech, confusion, convulsions, dizziness, focal weakness, frequent falls, lack 

of coordination, memory loss, numbness, paresthesias, restless legs, syncope, 

tingling, tremor(s), vertigo, weakness, other


Psychiatric: ABSENT: as per HPI, anxiety, depression, hallucinations, homidical 

ideation, suicidal ideation, other


Endocrine: ABSENT: as per HPI, cold intolerance, flushing, heat intolerance, 

menstrual abnormalities, polydipsia, polyphagia, polyuria, other


Hematologic/Lymphatic: ABSENT: as per HPI, easy bleeding, easy bruising, 

lymphadenopathy, other


Allergic/Immunologic: ABSENT: as per HPI, seasonal rhinorrhea, other





Physical Exam


Vital Signs: 


                                        











Temp Pulse Resp BP Pulse Ox


 


 98.2 F   63   21 H  117/61   100 


 


 05/09/20 08:00  05/09/20 08:00  05/09/20 08:00  05/09/20 08:00  05/09/20 08:00








                                 Intake & Output











 05/08/20 05/09/20 05/10/20





 06:59 06:59 06:59


 


Intake Total  1050 460


 


Output Total   360


 


Balance  1050 100


 


Weight  89.6 kg 











General appearance: PRESENT: mild distress


Head exam: PRESENT: normocephalic


Eye exam: PRESENT: EOMI


Ear exam: PRESENT: normal external ear exam


Mouth exam: PRESENT: moist


Neck exam: PRESENT: full ROM


Respiratory exam: PRESENT: clear to auscultation nesha


Cardiovascular exam: PRESENT: RRR


Pulses: PRESENT: +2 pedal pulses bilateral


Breast: PRESENT: Normal


GI/Abdominal exam: PRESENT: Hamilton's sign


Rectal exam: PRESENT: deferred


Extremities exam: PRESENT: full ROM


Musculoskeletal exam: PRESENT: full ROM


Neurological exam: PRESENT: alert, awake, oriented to person, oriented to place


Psychiatric exam: PRESENT: appropriate affect


Skin exam: PRESENT: dry





Results


Laboratory Results: 


                                        





                                 05/09/20 03:00 





                                 05/09/20 03:00 





                                        











  05/09/20 05/09/20 05/09/20





  03:00 03:00 03:00


 


WBC  9.6  


 


RBC  4.26  


 


Hgb  12.3  


 


Hct  35.6 L  


 


MCV  84  


 


MCH  28.9  


 


MCHC  34.5  


 


RDW  16.2 H  


 


Plt Count  359  


 


Seg Neutrophils %  71.0  


 


Sodium   136.6 L 


 


Potassium   4.0 


 


Chloride   103 


 


Carbon Dioxide   24 


 


Anion Gap   10 


 


BUN   7 


 


Creatinine   0.61 


 


Est GFR ( Amer)   > 60 


 


Glucose   94 


 


Calcium   9.3 


 


Total Bilirubin   2.4 H 


 


AST   88 H 


 


Alkaline Phosphatase   146 H 


 


Total Protein   7.7 


 


Albumin   4.4 


 


Lipase   121.6 


 


Serum HCG, Qual    NEGATIVE


 


Urine Color   


 


Urine Appearance   


 


Urine pH   


 


Ur Specific Gravity   


 


Urine Protein   


 


Urine Glucose (UA)   


 


Urine Ketones   


 


Urine Blood   


 


Urine Nitrite   


 


Ur Leukocyte Esterase   


 


Urine WBC (Auto)   


 


Urine RBC (Auto)   














  05/09/20





  03:00


 


WBC 


 


RBC 


 


Hgb 


 


Hct 


 


MCV 


 


MCH 


 


MCHC 


 


RDW 


 


Plt Count 


 


Seg Neutrophils % 


 


Sodium 


 


Potassium 


 


Chloride 


 


Carbon Dioxide 


 


Anion Gap 


 


BUN 


 


Creatinine 


 


Est GFR (African Amer) 


 


Glucose 


 


Calcium 


 


Total Bilirubin 


 


AST 


 


Alkaline Phosphatase 


 


Total Protein 


 


Albumin 


 


Lipase 


 


Serum HCG, Qual 


 


Urine Color  DILIA


 


Urine Appearance  CLEAR


 


Urine pH  6.0


 


Ur Specific Gravity  1.010


 


Urine Protein  NEGATIVE


 


Urine Glucose (UA)  NEGATIVE


 


Urine Ketones  NEGATIVE


 


Urine Blood  NEGATIVE


 


Urine Nitrite  NEGATIVE


 


Ur Leukocyte Esterase  NEGATIVE


 


Urine WBC (Auto)  2


 


Urine RBC (Auto)  1











Impressions: 


                                        





Abdomen Ultrasound  05/09/20 02:49


IMPRESSION:


1.  No acute findings.


2.  Poorly defined sludge or echogenic foci measuring up to 1.1


cm may indicate gallbladder sludge, adherent stones, and/or


polyp. Negative sonographic Hamilton's test. Recommend three month


ultrasound surveillance.


3.  Moderate hepatic steatosis.


4.  Limitation.


 














Assessment & Plan





- Plan Summary


Plan Summary: 





acute cholecystitis, possible choledocholithiasis


plan to or for jackeline solis


notified Dr Chamorro

## 2020-05-09 NOTE — ER DOCUMENT REPORT
ED GI/





- General


Chief Complaint: Right upper quadrant pain


Stated Complaint: ABDOMINAL PAIN


Time Seen by Provider: 05/09/20 02:15


Primary Care Provider: 


NO RODRIGUEZ MD [Primary Care Provider] - Follow up as needed


Mode of Arrival: Ambulatory


Information source: Patient


Notes: 





Patient presents with a 10-day history of right upper quadrant pain that radiate

s to her shoulders.  Patient states she has had nausea vomiting as well.  

Patient reports vomiting x3 episodes.  Patient denies any cough or congestion.  

Patient denies any urinary symptoms.  Patient states that she was seen here 2 

days ago and is awaiting to follow-up with her primary doctor to see about 

having an outpatient HIDA scan performed.  Patient denies any fever.  Patient 

states she has not been able to tolerate any oral food or fluids due to the 

vomiting and pain symptoms.  Patient has nausea medicine and pain medication at 

home that have not been helping.


TRAVEL OUTSIDE OF THE [place] IN LAST 30 DAYS: No





- HPI


Patient complains to provider of: Abdominal pain, Vomiting


Onset: Other - 10 days


Timing/Duration: Persistent


Quality of pain: Sharp, Stabbing


Pain Level: 5


Location: RUQ


Associated symptoms: Nausea, Vomiting.  denies: Diarrhea, Urinary hesitancy, 

Urinary frequency, Urinary retention, Urinary urgency


Exacerbated by: Food


Relieved by: Denies


Similar symptoms previously: No


Recently seen / treated by doctor: Yes





- Related Data


Allergies/Adverse Reactions: 


                                        





Penicillins Allergy (Verified 11/17/18 20:14)


   


shellfish derived Allergy (Verified 11/17/18 20:14)


   











Past Medical History





- General


Information source: Patient





- Social History


Smoking Status: Never Smoker


Frequency of alcohol use: Social


Drug Abuse: None


Occupation: Nursing assistant


Lives with: Family


Family History: Reviewed & Not Pertinent


Patient has homicidal ideation: No


Renal/ Medical History: Denies: Hx Peritoneal Dialysis


GI Medical History: Reports: Hx Gastroesophageal Reflux Disease


Surgical Hx: Negative





- Immunizations


Immunizations up to date: Yes


Hx Diphtheria, Pertussis, Tetanus Vaccination: Yes





Review of Systems





- Review of Systems


Constitutional: No symptoms reported.  denies: Fever


EENT: No symptoms reported


Cardiovascular: No symptoms reported.  denies: Chest pain


Respiratory: No symptoms reported.  denies: Cough


Gastrointestinal: Abdominal pain, Nausea, Vomiting.  denies: Diarrhea


Genitourinary: No symptoms reported.  denies: Dysuria, Flank pain


Female Genitourinary: No symptoms reported.  denies: Pregnant


Musculoskeletal: Other - Pain radiates from abdomen to shoulder


Skin: No symptoms reported


Hematologic/Lymphatic: No symptoms reported


Neurological/Psychological: No symptoms reported





Physical Exam





- Vital signs


Vitals: 


                                        











Temp Pulse Resp BP Pulse Ox


 


 98.4 F   86   16   121/59 L  100 


 


 05/09/20 01:39  05/09/20 01:39  05/09/20 01:39  05/09/20 01:39  05/09/20 01:39














- General


General appearance: Appears well, Alert


In distress: None





- HEENT


Head: Normocephalic, Atraumatic


Eyes: Normal


Conjunctiva: Normal


Nasal: Normal


Mouth/Lips: Normal


Mucous membranes: Normal


Neck: Normal, Supple.  No: Lymphadenopathy





- Respiratory


Respiratory status: No respiratory distress


Chest status: Nontender


Breath sounds: Normal.  No: Rales, Rhonchi, Stridor, Wheezing


Chest palpation: Normal





- Cardiovascular


Rhythm: Regular


Heart sounds: S1 appreciated, S2 appreciated





- Abdominal


Inspection: Morbidly Obese


Distension: No distension


Bowel sounds: Normal


Tenderness: Tender - RUQ


Organomegaly: No organomegaly





- Back


Back: Normal.  No: CVA tenderness





- Extremities


General upper extremity: Normal inspection, Normal strength


General lower extremity: Normal inspection, Normal strength





- Neurological


Neuro grossly intact: Yes


Cognition: Normal


Ethan Coma Scale Eye Opening: Spontaneous


Fulton Coma Scale Verbal: Oriented


F[place]on Coma Scale Motor: Obeys Commands


Ethan Coma Scale Total: 15





- Psychological


Associated symptoms: Normal affect, Normal mood





- Skin


Skin Temperature: Warm


Skin Moisture: Dry


Skin Color: Normal





Course





- Re-evaluation


Re-evalutation: 





05/09/20 04:20


Patient complains of continued abdominal tenderness and is requesting additional

pain medication at this time.





05/09/20 04:30


Ultrasound report reviewed, consulted with surgeon Dr. Tolbert regarding patient 

presentation and patient's increase in LFTs and bilirubin today as compared with

ER visit 2 days ago.  Dr. Tolbert does agree to come and evaluate patient.


05/09/20 05:13


Patient states that she is allergic to penicillin but is uncertain of the 

specific symptoms with allergy.  Patient states she has taken cephalosporins as 

well as amoxicillin in the past without adverse reaction.


Patient states that Dr. Tolbert informed her that the a.m. surgeon will be by to 

evaluate her sometime after 7 AM.





- Vital Signs


Vital signs: 


                                        











Temp Pulse Resp BP Pulse Ox


 


 98.2 F   88   18   127/66 H  100 


 


 05/09/20 04:26  05/09/20 04:26  05/09/20 04:26  05/09/20 04:26  05/09/20 04:26














- Laboratory


Result Diagrams: 


                                 05/09/20 03:00





                                 05/09/20 03:00


Laboratory results interpreted by me: 


                                        











  05/09/20 05/09/20





  03:00 03:00


 


Hct  35.6 L 


 


RDW  16.2 H 


 


Sodium   136.6 L


 


Total Bilirubin   2.4 H


 


Direct Bilirubin   1.7 H


 


AST   88 H


 


ALT   187 H


 


Alkaline Phosphatase   146 H











                               Labs- Entire Visit











  05/09/20 05/09/20 05/09/20





  03:00 03:00 03:00


 


WBC  9.6  


 


RBC  4.26  


 


Hgb  12.3  


 


Hct  35.6 L  


 


MCV  84  


 


MCH  28.9  


 


MCHC  34.5  


 


RDW  16.2 H  


 


Plt Count  359  


 


Lymph % (Auto)  17.4  


 


Mono % (Auto)  9.1  


 


Eos % (Auto)  2.1  


 


Baso % (Auto)  0.4  


 


Absolute Neuts (auto)  6.8  


 


Absolute Lymphs (auto)  1.7  


 


Absolute Monos (auto)  0.9  


 


Absolute Eos (auto)  0.2  


 


Absolute Basos (auto)  0.0  


 


Seg Neutrophils %  71.0  


 


Sodium   136.6 L 


 


Potassium   4.0 


 


Chloride   103 


 


Carbon Dioxide   24 


 


Anion Gap   10 


 


BUN   7 


 


Creatinine   0.61 


 


Est GFR ( Amer)   > 60 


 


Est GFR (MDRD) Non-Af   > 60 


 


Glucose   94 


 


Calcium   9.3 


 


Total Bilirubin   2.4 H 


 


Direct Bilirubin   1.7 H 


 


Neonat Total Bilirubin   Not Reportable 


 


Neonat Direct Bilirubin   Not Reportable 


 


Neonat Indirect Bili   Not Reportable 


 


AST   88 H 


 


ALT   187 H 


 


Alkaline Phosphatase   146 H 


 


Total Protein   7.7 


 


Albumin   4.4 


 


Lipase   121.6 


 


Serum HCG, Qual    NEGATIVE


 


Urine Color   


 


Urine Appearance   


 


Urine pH   


 


Ur Specific Gravity   


 


Urine Protein   


 


Urine Glucose (UA)   


 


Urine Ketones   


 


Urine Blood   


 


Urine Nitrite   


 


Urine Bilirubin   


 


Urine Urobilinogen   


 


Ur Leukocyte Esterase   


 


Urine WBC (Auto)   


 


Urine RBC (Auto)   


 


Urine Bacteria (Auto)   


 


Squamous Epi Cells Auto   


 


Urine Mucus (Auto)   


 


Urine Ascorbic Acid   














  05/09/20





  03:00


 


WBC 


 


RBC 


 


Hgb 


 


Hct 


 


MCV 


 


MCH 


 


MCHC 


 


RDW 


 


Plt Count 


 


Lymph % (Auto) 


 


Mono % (Auto) 


 


Eos % (Auto) 


 


Baso % (Auto) 


 


Absolute Neuts (auto) 


 


Absolute Lymphs (auto) 


 


Absolute Monos (auto) 


 


Absolute Eos (auto) 


 


Absolute Basos (auto) 


 


Seg Neutrophils % 


 


Sodium 


 


Potassium 


 


Chloride 


 


Carbon Dioxide 


 


Anion Gap 


 


BUN 


 


Creatinine 


 


Est GFR ( Amer) 


 


Est GFR (MDRD) Non-Af 


 


Glucose 


 


Calcium 


 


Total Bilirubin 


 


Direct Bilirubin 


 


Neonat Total Bilirubin 


 


Neonat Direct Bilirubin 


 


Neonat Indirect Bili 


 


AST 


 


ALT 


 


Alkaline Phosphatase 


 


Total Protein 


 


Albumin 


 


Lipase 


 


Serum HCG, Qual 


 


Urine Color  DILIA


 


Urine Appearance  CLEAR


 


Urine pH  6.0


 


Ur Specific Gravity  1.010


 


Urine Protein  NEGATIVE


 


Urine Glucose (UA)  NEGATIVE


 


Urine Ketones  NEGATIVE


 


Urine Blood  NEGATIVE


 


Urine Nitrite  NEGATIVE


 


Urine Bilirubin  NEGATIVE


 


Urine Urobilinogen  NEGATIVE


 


Ur Leukocyte Esterase  NEGATIVE


 


Urine WBC (Auto)  2


 


Urine RBC (Auto)  1


 


Urine Bacteria (Auto)  TRACE


 


Squamous Epi Cells Auto  3


 


Urine Mucus (Auto)  RARE


 


Urine Ascorbic Acid  NEGATIVE








Reviewed laboratory studies from 2 days ago





- Diagnostic Test


Radiology reviewed: Reports reviewed





Discharge





- Discharge


Clinical Impression: 


 RUQ pain, Cholecystitis





Nausea & vomiting


Qualifiers:


 Vomiting type: unspecified Vomiting Intractability: unspecified Qualified 

Code(s): R11.2 - Nausea with vomiting, unspecified





Condition: Stable


Disposition: ADMITTED AS INPATIENT


Admitting Provider: Surgicalist


Unit Admitted: Surgical Floor


Referrals: 


NO RODRIGUEZ MD [Primary Care Provider] - Follow up as needed

## 2020-05-09 NOTE — RADIOLOGY REPORT (SQ)
EXAM DESCRIPTION:  CHOLANGIOGRAM OPERATIVE



IMAGES COMPLETED DATE/TIME:  5/9/2020 12:08 pm



REASON FOR STUDY:  CHOLANGIOGRAM IN OR



COMPARISON:  None.



FLUOROSCOPY TIME:  2 minutes

8 images saved to PACS.



TECHNIQUE:  8 images were obtained from an intraoperative cholangiogram.



LIMITATIONS:  None.



FINDINGS:  Several images with filling defect distally.  Appears to of resolved on the last image.  P
roximal loose since the seen only on 1 image.



IMPRESSION:  INTRAOPERATIVE CHOLANGIOGRAM.



COMMENT:  Quality :  Final reports for procedures using fluoroscopy that document radiation exp
osure indices, or exposure time and number of fluorographic images (if radiation exposure indices are
 not available)



TECHNICAL DOCUMENTATION:  JOB ID:  [id number]

 2011 [employer]- All Rights Reserved



Reading location - IP/workstation name: [email or web address]

## 2020-05-10 LAB
ADD MANUAL DIFF: NO
ALBUMIN SERPL-MCNC: 3.4 G/DL (ref 3.5–5)
ALP SERPL-CCNC: 130 U/L (ref 38–126)
ANION GAP SERPL CALC-SCNC: 9 MMOL/L (ref 5–19)
AST SERPL-CCNC: 103 U/L (ref 14–36)
BASOPHILS # BLD AUTO: 0 10^3/UL (ref 0–0.2)
BASOPHILS NFR BLD AUTO: 0.2 % (ref 0–2)
BILIRUB DIRECT SERPL-MCNC: 2 MG/DL (ref 0–0.4)
BILIRUB SERPL-MCNC: 2.7 MG/DL (ref 0.2–1.3)
BUN SERPL-MCNC: 5 MG/DL (ref 7–20)
CALCIUM: 8.8 MG/DL (ref 8.4–10.2)
CHLORIDE SERPL-SCNC: 105 MMOL/L (ref 98–107)
CO2 SERPL-SCNC: 22 MMOL/L (ref 22–30)
EOSINOPHIL # BLD AUTO: 0 10^3/UL (ref 0–0.6)
EOSINOPHIL NFR BLD AUTO: 0.1 % (ref 0–6)
ERYTHROCYTE [DISTWIDTH] IN BLOOD BY AUTOMATED COUNT: 16.1 % (ref 11.5–14)
GLUCOSE SERPL-MCNC: 91 MG/DL (ref 75–110)
HCT VFR BLD CALC: 30.9 % (ref 36–47)
HGB BLD-MCNC: 10.3 G/DL (ref 12–15.5)
LYMPHOCYTES # BLD AUTO: 1.3 10^3/UL (ref 0.5–4.7)
LYMPHOCYTES NFR BLD AUTO: 10.8 % (ref 13–45)
MCH RBC QN AUTO: 27.8 PG (ref 27–33.4)
MCHC RBC AUTO-ENTMCNC: 33.3 G/DL (ref 32–36)
MCV RBC AUTO: 84 FL (ref 80–97)
MONOCYTES # BLD AUTO: 1 10^3/UL (ref 0.1–1.4)
MONOCYTES NFR BLD AUTO: 8.1 % (ref 3–13)
NEUTROPHILS # BLD AUTO: 9.5 10^3/UL (ref 1.7–8.2)
NEUTS SEG NFR BLD AUTO: 80.8 % (ref 42–78)
PLATELET # BLD: 307 10^3/UL (ref 150–450)
POTASSIUM SERPL-SCNC: 4.2 MMOL/L (ref 3.6–5)
PROT SERPL-MCNC: 6.3 G/DL (ref 6.3–8.2)
RBC # BLD AUTO: 3.7 10^6/UL (ref 3.72–5.28)
TOTAL CELLS COUNTED % (AUTO): 100 %
WBC # BLD AUTO: 11.8 10^3/UL (ref 4–10.5)

## 2020-05-10 PROCEDURE — 0F758ZZ DILATION OF RIGHT HEPATIC DUCT, VIA NATURAL OR ARTIFICIAL OPENING ENDOSCOPIC: ICD-10-PCS | Performed by: INTERNAL MEDICINE

## 2020-05-10 PROCEDURE — 0F798ZZ DILATION OF COMMON BILE DUCT, VIA NATURAL OR ARTIFICIAL OPENING ENDOSCOPIC: ICD-10-PCS | Performed by: INTERNAL MEDICINE

## 2020-05-10 PROCEDURE — 0F768ZZ DILATION OF LEFT HEPATIC DUCT, VIA NATURAL OR ARTIFICIAL OPENING ENDOSCOPIC: ICD-10-PCS | Performed by: INTERNAL MEDICINE

## 2020-05-10 PROCEDURE — 0F7D8ZZ DILATION OF PANCREATIC DUCT, VIA NATURAL OR ARTIFICIAL OPENING ENDOSCOPIC: ICD-10-PCS | Performed by: INTERNAL MEDICINE

## 2020-05-10 RX ADMIN — LEVOFLOXACIN SCH MLS/HR: 500 INJECTION, SOLUTION INTRAVENOUS at 09:34

## 2020-05-10 RX ADMIN — MORPHINE SULFATE PRN MG: 10 INJECTION INTRAMUSCULAR; INTRAVENOUS; SUBCUTANEOUS at 23:39

## 2020-05-10 RX ADMIN — Medication SCH: at 14:06

## 2020-05-10 RX ADMIN — Medication SCH: at 05:41

## 2020-05-10 RX ADMIN — Medication SCH: at 21:07

## 2020-05-10 RX ADMIN — MORPHINE SULFATE PRN MG: 10 INJECTION INTRAMUSCULAR; INTRAVENOUS; SUBCUTANEOUS at 09:33

## 2020-05-10 NOTE — PDOC CONSULTATION
Consultation


Consult Date: 05/09/20


Provider Consulted: ALLISON CORTEZ





History of Present Illness


Admission Date/PCP: 


  05/09/20 05:26





  NO RODRIGUEZ MD





History of Present Illness: 


AYAKA CLEMENTS is a 24 year old femalePatient who was admitted 2 days ago with 

abdominal pain, gallstones and abnormal LFTs.  Her bilirubin was 2.4 on 

admission with a lipase of 121.  She had a cholecystectomy on 5/9/2020 

consistent with choledocholithiasis.  She has been having recurrent abdominal 

pain since she had her baby a year ago but is got worse over the last week.  Her

ultrasound showed no dilated ducts but she had fatty liver and s

ludge/gallstones.








Past Medical History


GI Medical History: Reports: Gastroesophageal Reflux Disease


Psychiatric Medical History: Reports: Depression





Past Surgical History


Past Surgical History: Reports: None





Social History


Lives with: Family


Smoking Status: Never Smoker


Electronic Cigarette use?: No


Frequency of Alcohol Use: None


Hx Recreational Drug Use: No


Drugs: None


Hx Prescription Drug Abuse: No





- Advance Directive


Resuscitation Status: Full Code





Family History


Family History: Reviewed & Not Pertinent


Parental Family History Reviewed: No


Children Family History Reviewed: NA


Sibling(s) Family History Reviewed.: NA





Medication/Allergy


Home Medications: 








No Home Medications  05/09/20 








Allergies/Adverse Reactions: 


                                        





Penicillins Allergy (Verified 11/17/18 20:14)


   


shellfish derived Allergy (Verified 11/17/18 20:14)


   











Review of Systems


All systems: reviewed and no additional remarkable complaints except as stated





Physical Exam


Vital Signs: 


                                        











Temp Pulse Resp BP Pulse Ox


 


 98.1 F   80   16   108/58 L  97 


 


 05/10/20 11:07  05/10/20 11:07  05/10/20 11:07  05/10/20 11:07  05/10/20 11:07








                                 Intake & Output











 05/09/20 05/10/20 05/11/20





 06:59 06:59 06:59


 


Intake Total 1050 3926 100


 


Output Total  2320 200


 


Balance 1050 1606 -100


 


Weight 89.6 kg 96.5 kg 











Exam: 





General: Patient is alert and looks well.





HEENT: There is no pallor or jaundice.  PERRLA.  Oropharynx normal





Respiratory: No chest deformity. No respiratory distress.  Chest wall 

palpitation was unremarkable.  Breath sounds were normal





Cardiovascular: Heart sounds 1 and 2 normal with no murmurs.





Abdominal: Not distended.  Soft and nontender.  Liver and spleen not palpable.  

No ascites demonstrated.  Bowel sounds active.  Rectal examination was deferred.





Extremities: No edema





Neurological: Alert and oriented x4.  Grossly nonfocal.  Normal speech





Skin: No significant rash





Psychological: Normal affect





Results


Laboratory Results: 


                                        





                                 05/10/20 05:38 





                                 05/10/20 05:38 





                                        











  05/10/20 05/10/20





  05:38 05:38


 


WBC  11.8 H 


 


RBC  3.70 L 


 


Hgb  10.3 L 


 


Hct  30.9 L 


 


MCV  84 


 


MCH  27.8 


 


MCHC  33.3 


 


RDW  16.1 H 


 


Plt Count  307 


 


Seg Neutrophils %  80.8 H 


 


Sodium   135.9 L


 


Potassium   4.2


 


Chloride   105


 


Carbon Dioxide   22


 


Anion Gap   9


 


BUN   5 L


 


Creatinine   0.50 L


 


Est GFR (African Amer)   > 60


 


Glucose   91


 


Calcium   8.8


 


Total Bilirubin   2.7 H


 


AST   103 H


 


Alkaline Phosphatase   130 H


 


Total Protein   6.3


 


Albumin   3.4 L











Impressions: 


                                        





Cholangiogram  05/09/20 00:00


IMPRESSION:  INTRAOPERATIVE CHOLANGIOGRAM.


 








Abdomen Ultrasound  05/09/20 02:49


IMPRESSION:


1.  No acute findings.


2.  Poorly defined sludge or echogenic foci measuring up to 1.1


cm may indicate gallbladder sludge, adherent stones, and/or


polyp. Negative sonographic Hamilton's test. Recommend three month


ultrasound surveillance.


3.  Moderate hepatic steatosis.


4.  Limitation.


 














Assessment & Plan





- Diagnosis


(1) Choledocholithiasis


Is this a current diagnosis for this admission?: Yes   


Plan: 


Intraoperative cholangiogram is consistent with choledocholithiasis.  The need 

for an ERCP including the risks and benefits were explained to the patient and 

she is in agreement.








(2) Abnormal findings on imaging of biliary tract


Is this a current diagnosis for this admission?: Yes   





(3) Abnormal liver function


Is this a current diagnosis for this admission?: Yes

## 2020-05-10 NOTE — OPERATIVE REPORT
Operative Report


DATE OF SURGERY: 05/10/20


Operative Report: 





Pre-op diagnosis: Gallstones and jaundice with abnormal intraoperative 

cholangiogram





Post-op diagnosis: Common bile duct stone





Surgery: ERCP with sphincterotomy and balloon stone extraction





Medications: As per anesthesia





Tissue removed: None





Procedure: After informed consent obtained from patient, patient was placed 

under general anesthesia.  The ERCP endoscope was then inserted into the 

esophagus blindly and advanced into the stomach.  The duodenum was entered and 

the ampulla was identified.  Using the triple-lumen sphincterotomy catheter the 

common bile duct was freely cannulated.  A cholangiogram was obtained .  A good 

sized sphincterotomy was then performed using the endocut mode.  The catheter 

was removed over the guidewire before a 9-12 mm balloon catheter was inserted.  

The balloon was inflated to 12 mm in the proximal common bile duct and pulled 

down the duct.  The duct was swept two more times.  A balloon occlusion 

cholangiogram was normal. Patient tolerated procedure well.





Findings





Common bile duct: Small yellow stone was extracted.


Intrahepatic ducts: Normal


Pancreatic duct: Not cannulated








Plan: Follow liver function tests


OPERATION: .

## 2020-05-10 NOTE — PDOC PROGRESS REPORT
Subjective


Progress Note for:: 05/10/20


Subjective:: 





Feels okay less right upper quadrant abdominal pain


Reason For Visit: 


CHOLECYSTICIS WITH CHOLEDOCHOLITHIASIS








Physical Exam


Vital Signs: 


                                        











Temp Pulse Resp BP Pulse Ox


 


 98.1 F   80   16   108/58 L  97 


 


 05/10/20 11:07  05/10/20 11:07  05/10/20 11:07  05/10/20 11:07  05/10/20 11:07








                                 Intake & Output











 05/09/20 05/10/20 05/11/20





 06:59 06:59 06:59


 


Intake Total 1050 3926 


 


Output Total  2320 200


 


Balance 1050 1606 -200


 


Weight 89.6 kg 96.5 kg 











General appearance: PRESENT: no acute distress


Head exam: PRESENT: normocephalic


Eye exam: PRESENT: EOMI


Ear exam: PRESENT: normal external ear exam


Mouth exam: PRESENT: moist


Neck exam: PRESENT: full ROM


Respiratory exam: PRESENT: clear to auscultation nesha


Cardiovascular exam: PRESENT: RRR


Pulses: PRESENT: normal radial pulses, normal femoral pulses


Breast: PRESENT: Normal


GI/Abdominal exam: PRESENT: soft


Rectal exam: PRESENT: deferred


Extremities exam: PRESENT: full ROM


Musculoskeletal exam: PRESENT: full ROM


Neurological exam: PRESENT: alert, awake, oriented to person, oriented to place


Skin exam: PRESENT: dry





Results


Laboratory Results: 


                                        





                                 05/10/20 05:38 





                                 05/10/20 05:38 





                                        











  05/10/20 05/10/20





  05:38 05:38


 


WBC  11.8 H 


 


RBC  3.70 L 


 


Hgb  10.3 L 


 


Hct  30.9 L 


 


MCV  84 


 


MCH  27.8 


 


MCHC  33.3 


 


RDW  16.1 H 


 


Plt Count  307 


 


Seg Neutrophils %  80.8 H 


 


Sodium   135.9 L


 


Potassium   4.2


 


Chloride   105


 


Carbon Dioxide   22


 


Anion Gap   9


 


BUN   5 L


 


Creatinine   0.50 L


 


Est GFR (African Amer)   > 60


 


Glucose   91


 


Calcium   8.8


 


Total Bilirubin   2.7 H


 


AST   103 H


 


Alkaline Phosphatase   130 H


 


Total Protein   6.3


 


Albumin   3.4 L











Impressions: 


                                        





Cholangiogram  05/09/20 00:00


IMPRESSION:  INTRAOPERATIVE CHOLANGIOGRAM.


 








Abdomen Ultrasound  05/09/20 02:49


IMPRESSION:


1.  No acute findings.


2.  Poorly defined sludge or echogenic foci measuring up to 1.1


cm may indicate gallbladder sludge, adherent stones, and/or


polyp. Negative sonographic Hamilton's test. Recommend three month


ultrasound surveillance.


3.  Moderate hepatic steatosis.


4.  Limitation.


 














Assessment & Plan





- Plan Summary


Plan Summary: 





Patient status post laparoscopic cholecystectomy and intraoperative cholangiog

nicola yesterday.





There is a retained common bile duct stone.





This morning her liver function studies are slightly elevated with rising 

bilirubin.





Dr. Alejandre plans on a ERCP later today.

## 2020-05-10 NOTE — RADIOLOGY REPORT (SQ)
EXAM DESCRIPTION:  ENDO CATH BILI/PANCREATIC; NO CHG FLUORO



IMAGES COMPLETED DATE/TIME:  5/10/2020 5:13 pm



REASON FOR STUDY:  ERCP



COMPARISON:  None.



FLUOROSCOPY TIME:  2.4 minutes

11 images saved to PACS.



TECHNIQUE:  Intra-operative images acquired during surgical procedure to evaluate progress.

NUMBER OF IMAGES: 11



LIMITATIONS:  None.



FINDINGS:  Fluoroscopic images from ERCP.  There is opacification of the bile ducts and cystic duct r
emnant.



IMPRESSION:  IMAGE(S) OBTAINED DURING PROCEDURE.



COMMENT:  Quality :  Final reports for procedures using fluoroscopy that document radiation exp
osure indices, or exposure time and number of fluorographic images (if radiation exposure indices are
 not available)

Please consult full operative report of the attending physician for description of the procedure.



TECHNICAL DOCUMENTATION:  JOB ID:  [id number]

 2011 [employer]- All Rights Reserved



Reading location - IP/workstation name: [email or web address]

## 2020-05-10 NOTE — RADIOLOGY REPORT (SQ)
EXAM DESCRIPTION:  ENDO CATH BILI/PANCREATIC; NO CHG FLUORO



IMAGES COMPLETED DATE/TIME:  5/10/2020 5:13 pm



REASON FOR STUDY:  ERCP



COMPARISON:  None.



FLUOROSCOPY TIME:  2.4 minutes

11 images saved to PACS.



TECHNIQUE:  Intra-operative images acquired during surgical procedure to evaluate progress.

NUMBER OF IMAGES: 11



LIMITATIONS:  None.



FINDINGS:  Fluoroscopic images from ERCP.  There is opacification of the bile ducts and cystic duct r
emnant.



IMPRESSION:  IMAGE(S) OBTAINED DURING PROCEDURE.



COMMENT:  Quality :  Final reports for procedures using fluoroscopy that document radiation exp
osure indices, or exposure time and number of fluorographic images (if radiation exposure indices are
 not available)

Please consult full operative report of the attending physician for description of the procedure.



TECHNICAL DOCUMENTATION:  JOB ID:  [id number]

 2011 [employer]- All Rights Reserved



Reading location - IP/workstation name: [email or web address]

## 2020-05-11 VITALS — SYSTOLIC BLOOD PRESSURE: 127 MMHG | DIASTOLIC BLOOD PRESSURE: 66 MMHG

## 2020-05-11 LAB
ALBUMIN SERPL-MCNC: 3.5 G/DL (ref 3.5–5)
ALP SERPL-CCNC: 141 U/L (ref 38–126)
AST SERPL-CCNC: 80 U/L (ref 14–36)
BILIRUB DIRECT SERPL-MCNC: 0.5 MG/DL (ref 0–0.4)
BILIRUB SERPL-MCNC: 1.1 MG/DL (ref 0.2–1.3)
PROT SERPL-MCNC: 6.3 G/DL (ref 6.3–8.2)

## 2020-05-11 RX ADMIN — LEVOFLOXACIN SCH MLS/HR: 500 INJECTION, SOLUTION INTRAVENOUS at 09:29

## 2020-05-11 RX ADMIN — Medication SCH: at 05:25

## 2020-05-11 NOTE — PDOC DISCHARGE SUMMARY
General





- Admit/Disc Date/PCP


Admission Date/Primary Care Provider: 


  05/09/20 05:26





  NO RODRIGUEZ MD





Discharge Date: 05/11/20





- Discharge Diagnosis


Final Diagnosis: 





choledocolithiasis, cholecystitis





- Assessment


Summary: 


This is a 24-year-old female admitted to the hospital with hyperbilirubinemia 

and gallstones.  She was found to have choledocholithiasis.  She underwent lapa

roscopic cholecystectomy with cholangiogram.  The stone was unable to be 

extracted using laparoscopic means.  Secondary to this, Dr. Robledo was consulted 

for ERCP.  The patient underwent ERCP, and did well.  The patient is now 

ambulating, tolerating a diet, and is felt that she has reached maximal hospital

benefit.  At this time she is medically fit for discharge.





- Additional Information


Resuscitation Status: Full Code


Discharge Diet: As Tolerated


Discharge Activity: No Lifting Over 10 Pounds, No Lifting/Push/Pulling


Referrals: 


NO RODRIGUEZ MD [Primary Care Provider] - Follow up as needed


Prescriptions: 


Hydrocodone/Acetaminophen [Norco  mg Tablet] 1 tab PO Q6HP PRN #14 tablet


 PRN Reason: For Pain


Home Medications: 








Hydrocodone/Acetaminophen [Norco  mg Tablet] 1 tab PO Q6HP PRN #14 tablet 

05/11/20 











History of Present Illiness


History of Present Illness: 


AYAKA CLEMENTS is a 24 year old female








Physical Exam


Vital Signs: 


                                        











Temp Pulse Resp BP Pulse Ox


 


 99.0 F   62   16   109/31 L  97 


 


 05/11/20 07:51  05/11/20 07:51  05/11/20 07:51  05/11/20 07:51  05/11/20 07:51








                                 Intake & Output











 05/10/20 05/11/20 05/12/20





 06:59 06:59 06:59


 


Intake Total 3926 1750 


 


Output Total 2320 200 


 


Balance 1606 1550 


 


Weight 96.5 kg 92.6 kg 














Results


Laboratory Results: 


                                        











WBC  11.8 10^3/uL (4.0-10.5)  H  05/10/20  05:38    


 


RBC  3.70 10^6/uL (3.72-5.28)  L  05/10/20  05:38    


 


Hgb  10.3 g/dL (12.0-15.5)  L  05/10/20  05:38    


 


Hct  30.9 % (36.0-47.0)  L  05/10/20  05:38    


 


MCV  84 fl (80-97)   05/10/20  05:38    


 


MCH  27.8 pg (27.0-33.4)   05/10/20  05:38    


 


MCHC  33.3 g/dL (32.0-36.0)   05/10/20  05:38    


 


RDW  16.1 % (11.5-14.0)  H  05/10/20  05:38    


 


Plt Count  307 10^3/uL (150-450)   05/10/20  05:38    


 


Lymph % (Auto)  10.8 % (13-45)  L  05/10/20  05:38    


 


Mono % (Auto)  8.1 % (3-13)   05/10/20  05:38    


 


Eos % (Auto)  0.1 % (0-6)   05/10/20  05:38    


 


Baso % (Auto)  0.2 % (0-2)   05/10/20  05:38    


 


Absolute Neuts (auto)  9.5 10^3/uL (1.7-8.2)  H  05/10/20  05:38    


 


Absolute Lymphs (auto)  1.3 10^3/uL (0.5-4.7)   05/10/20  05:38    


 


Absolute Monos (auto)  1.0 10^3/uL (0.1-1.4)   05/10/20  05:38    


 


Absolute Eos (auto)  0.0 10^3/uL (0.0-0.6)   05/10/20  05:38    


 


Absolute Basos (auto)  0.0 10^3/uL (0.0-0.2)   05/10/20  05:38    


 


Seg Neutrophils %  80.8 % (42-78)  H  05/10/20  05:38    


 


Sodium  135.9 mmol/L (137-145)  L  05/10/20  05:38    


 


Potassium  4.2 mmol/L (3.6-5.0)   05/10/20  05:38    


 


Chloride  105 mmol/L ()   05/10/20  05:38    


 


Carbon Dioxide  22 mmol/L (22-30)   05/10/20  05:38    


 


Anion Gap  9  (5-19)   05/10/20  05:38    


 


BUN  5 mg/dL (7-20)  L  05/10/20  05:38    


 


Creatinine  0.50 mg/dL (0.52-1.25)  L  05/10/20  05:38    


 


Est GFR ( Amer)  > 60  (>60)   05/10/20  05:38    


 


Est GFR (MDRD) Non-Af  > 60  (>60)   05/10/20  05:38    


 


Glucose  91 mg/dL ()   05/10/20  05:38    


 


Calcium  8.8 mg/dL (8.4-10.2)   05/10/20  05:38    


 


Total Bilirubin  1.1 mg/dL (0.2-1.3)   05/11/20  04:54    


 


Direct Bilirubin  0.5 mg/dL (0.0-0.4)  H  05/11/20  04:54    


 


Neonat Total Bilirubin  Not Reportable   05/11/20  04:54    


 


Neonat Direct Bilirubin  Not Reportable   05/11/20  04:54    


 


Neonat Indirect Bili  Not Reportable   05/11/20  04:54    


 


AST  80 U/L (14-36)  H  05/11/20  04:54    


 


ALT  151 U/L (<35)  H  05/11/20  04:54    


 


Alkaline Phosphatase  141 U/L ()  H  05/11/20  04:54    


 


Total Protein  6.3 g/dL (6.3-8.2)   05/11/20  04:54    


 


Albumin  3.5 g/dL (3.5-5.0)   05/11/20  04:54    


 


Lipase  121.6 U/L ()   05/09/20  03:00    


 


Serum HCG, Qual  NEGATIVE  (NEGATIVE)   05/09/20  03:00    


 


Urine Color  DILIA   05/09/20  03:00    


 


Urine Appearance  CLEAR   05/09/20  03:00    


 


Urine pH  6.0  (5.0-9.0)   05/09/20  03:00    


 


Ur Specific Gravity  1.010   05/09/20  03:00    


 


Urine Protein  NEGATIVE mg/dL (NEGATIVE)   05/09/20  03:00    


 


Urine Glucose (UA)  NEGATIVE mg/dL (NEGATIVE)   05/09/20  03:00    


 


Urine Ketones  NEGATIVE mg/dL (NEGATIVE)   05/09/20  03:00    


 


Urine Blood  NEGATIVE  (NEGATIVE)   05/09/20  03:00    


 


Urine Nitrite  NEGATIVE  (NEGATIVE)   05/09/20  03:00    


 


Urine Bilirubin  NEGATIVE  (NEGATIVE)   05/09/20  03:00    


 


Urine Urobilinogen  NEGATIVE mg/dL (<2.0)   05/09/20  03:00    


 


Ur Leukocyte Esterase  NEGATIVE  (NEGATIVE)   05/09/20  03:00    


 


Urine WBC (Auto)  2 /HPF  05/09/20  03:00    


 


Urine RBC (Auto)  1 /HPF  05/09/20  03:00    


 


Urine Bacteria (Auto)  TRACE /HPF  05/09/20  03:00    


 


Squamous Epi Cells Auto  3 /HPF  05/09/20  03:00    


 


Urine Mucus (Auto)  RARE /LPF  05/09/20  03:00    


 


Urine Ascorbic Acid  NEGATIVE  (NEGATIVE)   05/09/20  03:00    


 


SARS-CoV-2 (PCR)  NEGATIVE  (NEGATIVE)   05/09/20  18:43    











Impressions: 


                                        





Cholangiogram  05/09/20 00:00


IMPRESSION:  INTRAOPERATIVE CHOLANGIOGRAM.


 








Abdomen Ultrasound  05/09/20 02:49


IMPRESSION:


1.  No acute findings.


2.  Poorly defined sludge or echogenic foci measuring up to 1.1


cm may indicate gallbladder sludge, adherent stones, and/or


polyp. Negative sonographic Hamilton's test. Recommend three month


ultrasound surveillance.


3.  Moderate hepatic steatosis.


4.  Limitation.


 








Endo Retro Cholangiopancreatogram  05/10/20 00:00


IMPRESSION:  IMAGE(S) OBTAINED DURING PROCEDURE.


 








Fluoroscopy  05/10/20 00:00


IMPRESSION:  IMAGE(S) OBTAINED DURING PROCEDURE.

## 2024-12-06 NOTE — RADIOLOGY REPORT (SQ)
Ultrasound right upper quadrant on 5/7/2020 at 6:17 AM



CLINICAL INDICATION: Right upper quadrant and right back pain,

nausea



COMPARISON: None



FINDINGS: Multiple sonographic images are obtained throughout the

right upper quadrant, both transverse and sagittal images are

obtained. Examination was somewhat limited due to bowel gas and

patient body habitus. Visualized pancreas is unremarkable. There

is mild increased echogenicity in the liver suggesting mild fatty

infiltration. No focal liver lesion is noted. Right kidney shows

no hydronephrosis. There are no gallstones, gallbladder wall

thickening or pericholecystic fluid. Common duct measures 4 mm

which is within normal limits mitigating against obstruction of

the biliary tree.



IMPRESSION: Probable mild fatty infiltration of the liver,

otherwise essentially unremarkable.
[id number]&[id number]